# Patient Record
Sex: FEMALE | URBAN - METROPOLITAN AREA
[De-identification: names, ages, dates, MRNs, and addresses within clinical notes are randomized per-mention and may not be internally consistent; named-entity substitution may affect disease eponyms.]

---

## 2023-03-30 DIAGNOSIS — R92.8 OTHER ABNORMAL AND INCONCLUSIVE FINDINGS ON DIAGNOSTIC IMAGING OF BREAST: Primary | ICD-10-CM

## 2023-03-31 PROBLEM — N63.23 MASS OF LOWER OUTER QUADRANT OF LEFT BREAST: Status: ACTIVE | Noted: 2023-03-31

## 2023-03-31 NOTE — ASSESSMENT & PLAN NOTE
Patient has been given the options of sonocore and excisional biopsy and all indications for each have been discussed. We reviewed her films and reports. She understands the reasons behind obtaining tissue in order to determine a diagnosis.  We discussed the possible outcomes to include: benign, atypical, cancerous and non-correlative.  The last three would require an additional procedure.  She knows that further recommendations will be made after receiving the pathology report and that additional surgery/interventions may be needed.      PLAN:::: LEFT SONOCORE BREAST BIOPSY.    I will call her as soon as her pathology report is received.

## 2023-03-31 NOTE — PROGRESS NOTES
[facility]  Jimy Matthews MD, FACS  Hayden Trevizo NP-C      Chief Complaint:   Nathaly Chaudhary is a 77 y.o. female presenting today after her imaging workup found a suspicious mass in the left  breast for which biopsy has been recommended.   for She reports no interval changes on her self-breast examination.     History of Present Illness:   Nathaly Chaudhary is a 77 y.o. female who presents on with a left  breast mass for which biopsy has been recommended.  MD:::Mery Perez NP    Past Medical History:   Diagnosis Date    Mass of lower outer quadrant of left breast 3/31/2023      History reviewed. No pertinent surgical history.     Current Outpatient Medications:     albuterol (PROVENTIL/VENTOLIN HFA) 90 mcg/actuation inhaler, ProAir HFA Take No date recorded No form recorded No frequency recorded No route recorded No set duration recorded No set duration amount recorded active No dosage strength recorded No dosage strength units of measure recorded, Disp: , Rfl:     aspirin 81 MG Chew, Take 81 mg by mouth., Disp: , Rfl:     budesonide (PULMICORT) 0.25 mg/2 mL nebulizer solution, Inhale into the lungs., Disp: , Rfl:     celecoxib (CELEBREX) 100 MG capsule, celecoxib Take No date recorded No form recorded No frequency recorded No route recorded No set duration recorded No set duration amount recorded active No dosage strength recorded No dosage strength units of measure recorded, Disp: , Rfl:     cetirizine 0.24 % Dpet, cetirizine Take No date recorded No form recorded No frequency recorded No route recorded No set duration recorded No set duration amount recorded active No dosage strength recorded No dosage strength units of measure recorded, Disp: , Rfl:     cholecalciferol, vitamin D3, 10 mcg (400 unit) Cap capsule, Vitamin D3 Take No date recorded No form recorded No frequency recorded No route recorded No set duration recorded No set duration amount recorded active No  dosage strength recorded No dosage strength units of measure recorded, Disp: , Rfl:     diclofenac (VOLTAREN) 75 MG EC tablet, diclofenac sodium Take 2 times per day No date recorded tablet,delayed release (DR/EC) 2 times per day No route recorded No set duration recorded No set duration amount recorded active 75 mg, Disp: , Rfl:     esomeprazole (NEXIUM) 40 MG capsule, esomeprazole magnesium Take 1 time per day No date recorded capsule,delayed release(DR/EC) 1 time per day No route recorded No set duration recorded No set duration amount recorded active 40 mg, Disp: , Rfl:     fexofenadine (ALLEGRA) 180 MG tablet, fexofenadine Take No date recorded No form recorded No frequency recorded No route recorded No set duration recorded No set duration amount recorded suspended No dosage strength recorded No dosage strength units of measure recorded, Disp: , Rfl:     fluticasone-umeclidin-vilanter (TRELEGY ELLIPTA) 100-62.5-25 mcg DsDv, Inhale 1 puff into the lungs once daily., Disp: , Rfl:     gabapentin (NEURONTIN) 100 MG capsule, Take by mouth., Disp: , Rfl:     levalbuterol (XOPENEX) 0.31 mg/3 mL nebulizer solution, Inhale into the lungs., Disp: , Rfl:     LINZESS 290 mcg Cap capsule, Take by mouth., Disp: , Rfl:     lisinopriL (PRINIVIL,ZESTRIL) 20 MG tablet, lisinopril Take 1 time per day No date recorded tablet 1 time per day No route recorded No set duration recorded No set duration amount recorded active 20 mg, Disp: , Rfl:     losartan-hydrochlorothiazide 100-12.5 mg (HYZAAR) 100-12.5 mg Tab, Take by mouth., Disp: , Rfl:     metFORMIN (GLUCOPHAGE) 500 MG tablet, metformin Take 2 times per day No date recorded tablet 2 times per day No route recorded No set duration recorded No set duration amount recorded active 500 mg, Disp: , Rfl:     metoprolol succinate (TOPROL-XL) 25 MG 24 hr tablet, metoprolol succinate Take 1 time per day No date recorded tablet extended release 24 hr 1 time per day No route recorded No  set duration recorded No set duration amount recorded active 25 mg, Disp: , Rfl:     montelukast (SINGULAIR) 10 mg tablet, Take by mouth., Disp: , Rfl:     predniSONE (DELTASONE) 10 MG tablet, Take by mouth., Disp: , Rfl:     rosuvastatin (CRESTOR) 20 MG tablet, Take by mouth., Disp: , Rfl:     tiotropium (SPIRIVA) 18 mcg inhalation capsule, Spiriva with HandiHaler Take No date recorded No form recorded No frequency recorded No route recorded No set duration recorded No set duration amount recorded suspended No dosage strength recorded No dosage strength units of measure recorded, Disp: , Rfl:     triamcinolone (KENALOG) 0.5 % ointment, Apply topically., Disp: , Rfl:    Review of patient's allergies indicates:   Allergen Reactions    Tetanus vaccines and toxoid       Social History     Tobacco Use    Smoking status: Never    Smokeless tobacco: Never   Substance Use Topics    Alcohol use: Never      History reviewed. No pertinent family history.     Review of Systems   Integumentary:  Negative for color change, rash, mole/lesion, breast mass, breast discharge and breast tenderness.   Breast: Negative for mass and tenderness     Physical Exam   Constitutional: She appears well-developed. She is cooperative.   HENT:   Head: Normocephalic.   Cardiovascular:  Normal rate and regular rhythm.            Pulmonary/Chest: She exhibits no tenderness and no bony tenderness. Right breast exhibits no mass, no nipple discharge, no skin change and no tenderness. Left breast exhibits no mass, no nipple discharge, no skin change and no tenderness.   Abdominal: Soft. Normal appearance.   Musculoskeletal: Lymphadenopathy:      Upper Body:      Right upper body: No supraclavicular or axillary adenopathy.      Left upper body: No supraclavicular or axillary adenopathy.     Neurological: She is alert.   Skin: No rash noted.        MAMMOGRAM REPORT: area of asymmetry PTN    ULTRASOUND REPORT: 1.3 cm irregular mass at 5 o'clock  PTN    ASSESSMENT and PLAN of CARE    1. Mass of lower outer quadrant of left breast  Assessment & Plan:  Patient has been given the options of sonocore and excisional biopsy and all indications for each have been discussed. We reviewed her films and reports. She understands the reasons behind obtaining tissue in order to determine a diagnosis.  We discussed the possible outcomes to include: benign, atypical, cancerous and non-correlative.  The last three would require an additional procedure.  She knows that further recommendations will be made after receiving the pathology report and that additional surgery/interventions may be needed.      PLAN:::: LEFT SONOCORE BREAST BIOPSY.    I will call her as soon as her pathology report is received.        2. Other abnormal and inconclusive findings on diagnostic imaging of breast  -     Ambulatory referral/consult to Breast Surgery    Medical Decision Making:  It is my impression that this patient suffers all conditions contained in this medical document.  Each of these conditions did affect our plan of care and my medical decision making today.  It is my opinion that the medical decision making concerning this patient was of moderate difficulty based on the aforementioned conditions.  Any further recommendations will be communicated to the patient by me.  I have reviewed and verified her allergies, list of medications, medical and surgical histories, social history, and a pertinent review of symptoms.    Follow up:  I will phone her with her pathology report and make additional recommendations at that time.

## 2023-04-03 ENCOUNTER — OFFICE VISIT (OUTPATIENT)
Dept: SURGERY | Facility: CLINIC | Age: 78
End: 2023-04-03
Payer: MEDICARE

## 2023-04-03 VITALS — HEIGHT: 59 IN | WEIGHT: 176 LBS | BODY MASS INDEX: 35.48 KG/M2

## 2023-04-03 DIAGNOSIS — N63.23 MASS OF LOWER OUTER QUADRANT OF LEFT BREAST: ICD-10-CM

## 2023-04-03 DIAGNOSIS — R92.8 OTHER ABNORMAL AND INCONCLUSIVE FINDINGS ON DIAGNOSTIC IMAGING OF BREAST: ICD-10-CM

## 2023-04-03 PROCEDURE — 1159F PR MEDICATION LIST DOCUMENTED IN MEDICAL RECORD: ICD-10-PCS | Mod: CPTII,S$GLB,, | Performed by: SPECIALIST

## 2023-04-03 PROCEDURE — 99204 OFFICE O/P NEW MOD 45 MIN: CPT | Mod: S$GLB,,, | Performed by: SPECIALIST

## 2023-04-03 PROCEDURE — 1160F RVW MEDS BY RX/DR IN RCRD: CPT | Mod: CPTII,S$GLB,, | Performed by: SPECIALIST

## 2023-04-03 PROCEDURE — 1160F PR REVIEW ALL MEDS BY PRESCRIBER/CLIN PHARMACIST DOCUMENTED: ICD-10-PCS | Mod: CPTII,S$GLB,, | Performed by: SPECIALIST

## 2023-04-03 PROCEDURE — 99204 PR OFFICE/OUTPT VISIT, NEW, LEVL IV, 45-59 MIN: ICD-10-PCS | Mod: S$GLB,,, | Performed by: SPECIALIST

## 2023-04-03 PROCEDURE — 1126F PR PAIN SEVERITY QUANTIFIED, NO PAIN PRESENT: ICD-10-PCS | Mod: CPTII,S$GLB,, | Performed by: SPECIALIST

## 2023-04-03 PROCEDURE — 1126F AMNT PAIN NOTED NONE PRSNT: CPT | Mod: CPTII,S$GLB,, | Performed by: SPECIALIST

## 2023-04-03 PROCEDURE — 1159F MED LIST DOCD IN RCRD: CPT | Mod: CPTII,S$GLB,, | Performed by: SPECIALIST

## 2023-04-03 RX ORDER — TIOTROPIUM BROMIDE 18 UG/1
CAPSULE ORAL; RESPIRATORY (INHALATION)
COMMUNITY

## 2023-04-03 RX ORDER — METOPROLOL SUCCINATE 25 MG/1
TABLET, EXTENDED RELEASE ORAL
COMMUNITY

## 2023-04-03 RX ORDER — CELECOXIB 100 MG/1
CAPSULE ORAL
COMMUNITY

## 2023-04-03 RX ORDER — ROSUVASTATIN CALCIUM 20 MG/1
TABLET, COATED ORAL
COMMUNITY
Start: 2023-01-09

## 2023-04-03 RX ORDER — GABAPENTIN 100 MG/1
CAPSULE ORAL
COMMUNITY
Start: 2023-03-08

## 2023-04-03 RX ORDER — MINERAL OIL
ENEMA (ML) RECTAL
COMMUNITY

## 2023-04-03 RX ORDER — BUDESONIDE 0.25 MG/2ML
INHALANT ORAL
COMMUNITY

## 2023-04-03 RX ORDER — LOSARTAN POTASSIUM AND HYDROCHLOROTHIAZIDE 12.5; 1 MG/1; MG/1
TABLET ORAL
COMMUNITY
Start: 2023-01-09

## 2023-04-03 RX ORDER — DICLOFENAC SODIUM 75 MG/1
TABLET, DELAYED RELEASE ORAL
COMMUNITY

## 2023-04-03 RX ORDER — NAPROXEN SODIUM 220 MG/1
81 TABLET, FILM COATED ORAL
COMMUNITY

## 2023-04-03 RX ORDER — LISINOPRIL 20 MG/1
TABLET ORAL
COMMUNITY

## 2023-04-03 RX ORDER — ESOMEPRAZOLE MAGNESIUM 40 MG/1
CAPSULE, DELAYED RELEASE ORAL
COMMUNITY

## 2023-04-03 RX ORDER — PREDNISONE 10 MG/1
TABLET ORAL
COMMUNITY
Start: 2023-03-20

## 2023-04-03 RX ORDER — LEVALBUTEROL INHALATION SOLUTION 0.31 MG/3ML
SOLUTION RESPIRATORY (INHALATION)
COMMUNITY

## 2023-04-03 RX ORDER — ALBUTEROL SULFATE 90 UG/1
AEROSOL, METERED RESPIRATORY (INHALATION)
COMMUNITY

## 2023-04-03 RX ORDER — TRIAMCINOLONE ACETONIDE 5 MG/G
OINTMENT TOPICAL
COMMUNITY
Start: 2023-03-20

## 2023-04-03 RX ORDER — CALCIUM CARB/VITAMIN D3/VIT K1 500-500-40
TABLET,CHEWABLE ORAL
COMMUNITY

## 2023-04-03 RX ORDER — METFORMIN HYDROCHLORIDE 500 MG/1
TABLET ORAL
COMMUNITY

## 2023-04-03 RX ORDER — LINACLOTIDE 290 UG/1
CAPSULE, GELATIN COATED ORAL
COMMUNITY
Start: 2023-01-09

## 2023-04-03 RX ORDER — MONTELUKAST SODIUM 10 MG/1
TABLET ORAL
COMMUNITY
Start: 2023-01-09

## 2023-04-10 ENCOUNTER — OFFICE VISIT (OUTPATIENT)
Dept: SURGERY | Facility: CLINIC | Age: 78
End: 2023-04-10
Payer: MEDICARE

## 2023-04-10 DIAGNOSIS — C50.512 MALIGNANT NEOPLASM OF LOWER-OUTER QUADRANT OF LEFT BREAST OF FEMALE, ESTROGEN RECEPTOR POSITIVE: ICD-10-CM

## 2023-04-10 DIAGNOSIS — Z17.0 MALIGNANT NEOPLASM OF LOWER-OUTER QUADRANT OF LEFT BREAST OF FEMALE, ESTROGEN RECEPTOR POSITIVE: ICD-10-CM

## 2023-04-10 PROCEDURE — 1159F MED LIST DOCD IN RCRD: CPT | Mod: CPTII,S$GLB,, | Performed by: SPECIALIST

## 2023-04-10 PROCEDURE — 1160F RVW MEDS BY RX/DR IN RCRD: CPT | Mod: CPTII,S$GLB,, | Performed by: SPECIALIST

## 2023-04-10 PROCEDURE — 99215 OFFICE O/P EST HI 40 MIN: CPT | Mod: S$GLB,,, | Performed by: SPECIALIST

## 2023-04-10 PROCEDURE — 1160F PR REVIEW ALL MEDS BY PRESCRIBER/CLIN PHARMACIST DOCUMENTED: ICD-10-PCS | Mod: CPTII,S$GLB,, | Performed by: SPECIALIST

## 2023-04-10 PROCEDURE — 99215 PR OFFICE/OUTPT VISIT, EST, LEVL V, 40-54 MIN: ICD-10-PCS | Mod: S$GLB,,, | Performed by: SPECIALIST

## 2023-04-10 PROCEDURE — 1159F PR MEDICATION LIST DOCUMENTED IN MEDICAL RECORD: ICD-10-PCS | Mod: CPTII,S$GLB,, | Performed by: SPECIALIST

## 2023-04-10 NOTE — PROGRESS NOTES
[facility]  Jimy Matthews MD, FACS  Hayden Trevizo, NP-C      Chief Complaint:   Nathaly Chaudhary is a 77 y.o. female presenting today after her imaging workup found a suspicious mass in the left  breast for which biopsy has been recommended.   for She reports no interval changes on her self-breast examination.     History of Present Illness:   Nathaly Chaudhary was diagnosed with left breast cancer in April 2023 at the age of 77. ER/ID Positive and HER 2 FISH negative.  MD:::Mery Perez NP; Beka Melton MD; Kendall Carranza MD; Plastics    Past Medical History:   Diagnosis Date    Malignant neoplasm of lower-outer quadrant of left breast of female, estrogen receptor positive 4/10/2023    Mass of lower outer quadrant of left breast 3/31/2023      History reviewed. No pertinent surgical history.     Current Outpatient Medications:     albuterol (PROVENTIL/VENTOLIN HFA) 90 mcg/actuation inhaler, ProAir HFA Take No date recorded No form recorded No frequency recorded No route recorded No set duration recorded No set duration amount recorded active No dosage strength recorded No dosage strength units of measure recorded, Disp: , Rfl:     aspirin 81 MG Chew, Take 81 mg by mouth., Disp: , Rfl:     budesonide (PULMICORT) 0.25 mg/2 mL nebulizer solution, Inhale into the lungs., Disp: , Rfl:     celecoxib (CELEBREX) 100 MG capsule, celecoxib Take No date recorded No form recorded No frequency recorded No route recorded No set duration recorded No set duration amount recorded active No dosage strength recorded No dosage strength units of measure recorded, Disp: , Rfl:     cetirizine 0.24 % Dpet, cetirizine Take No date recorded No form recorded No frequency recorded No route recorded No set duration recorded No set duration amount recorded active No dosage strength recorded No dosage strength units of measure recorded, Disp: , Rfl:     cholecalciferol, vitamin D3, 10 mcg (400 unit) Cap  capsule, Vitamin D3 Take No date recorded No form recorded No frequency recorded No route recorded No set duration recorded No set duration amount recorded active No dosage strength recorded No dosage strength units of measure recorded, Disp: , Rfl:     diclofenac (VOLTAREN) 75 MG EC tablet, diclofenac sodium Take 2 times per day No date recorded tablet,delayed release (DR/EC) 2 times per day No route recorded No set duration recorded No set duration amount recorded active 75 mg, Disp: , Rfl:     esomeprazole (NEXIUM) 40 MG capsule, esomeprazole magnesium Take 1 time per day No date recorded capsule,delayed release(DR/EC) 1 time per day No route recorded No set duration recorded No set duration amount recorded active 40 mg, Disp: , Rfl:     fexofenadine (ALLEGRA) 180 MG tablet, fexofenadine Take No date recorded No form recorded No frequency recorded No route recorded No set duration recorded No set duration amount recorded suspended No dosage strength recorded No dosage strength units of measure recorded, Disp: , Rfl:     fluticasone-umeclidin-vilanter (TRELEGY ELLIPTA) 100-62.5-25 mcg DsDv, Inhale 1 puff into the lungs once daily., Disp: , Rfl:     gabapentin (NEURONTIN) 100 MG capsule, Take by mouth., Disp: , Rfl:     levalbuterol (XOPENEX) 0.31 mg/3 mL nebulizer solution, Inhale into the lungs., Disp: , Rfl:     LINZESS 290 mcg Cap capsule, Take by mouth., Disp: , Rfl:     lisinopriL (PRINIVIL,ZESTRIL) 20 MG tablet, lisinopril Take 1 time per day No date recorded tablet 1 time per day No route recorded No set duration recorded No set duration amount recorded active 20 mg, Disp: , Rfl:     losartan-hydrochlorothiazide 100-12.5 mg (HYZAAR) 100-12.5 mg Tab, Take by mouth., Disp: , Rfl:     metFORMIN (GLUCOPHAGE) 500 MG tablet, metformin Take 2 times per day No date recorded tablet 2 times per day No route recorded No set duration recorded No set duration amount recorded active 500 mg, Disp: , Rfl:     metoprolol  succinate (TOPROL-XL) 25 MG 24 hr tablet, metoprolol succinate Take 1 time per day No date recorded tablet extended release 24 hr 1 time per day No route recorded No set duration recorded No set duration amount recorded active 25 mg, Disp: , Rfl:     montelukast (SINGULAIR) 10 mg tablet, Take by mouth., Disp: , Rfl:     predniSONE (DELTASONE) 10 MG tablet, Take by mouth., Disp: , Rfl:     rosuvastatin (CRESTOR) 20 MG tablet, Take by mouth., Disp: , Rfl:     tiotropium (SPIRIVA) 18 mcg inhalation capsule, Spiriva with HandiHaler Take No date recorded No form recorded No frequency recorded No route recorded No set duration recorded No set duration amount recorded suspended No dosage strength recorded No dosage strength units of measure recorded, Disp: , Rfl:     triamcinolone (KENALOG) 0.5 % ointment, Apply topically., Disp: , Rfl:    Review of patient's allergies indicates:   Allergen Reactions    Tetanus vaccines and toxoid       Social History     Tobacco Use    Smoking status: Never    Smokeless tobacco: Never   Substance Use Topics    Alcohol use: Never      History reviewed. No pertinent family history.     Review of Systems   Integumentary:  Negative for color change, rash, mole/lesion, breast mass, breast discharge and breast tenderness.   Breast: Negative for mass and tenderness     Physical Exam   Constitutional: She appears well-developed. She is cooperative.   HENT:   Head: Normocephalic.   Cardiovascular:  Normal rate and regular rhythm.            Pulmonary/Chest: She exhibits no tenderness and no bony tenderness. Right breast exhibits no mass, no nipple discharge, no skin change and no tenderness. Left breast exhibits no mass, no nipple discharge, no skin change and no tenderness.   Abdominal: Soft. Normal appearance.   Musculoskeletal: Lymphadenopathy:      Upper Body:      Right upper body: No supraclavicular or axillary adenopathy.      Left upper body: No supraclavicular or axillary adenopathy.  "    Neurological: She is alert.   Skin: No rash noted.        MAMMOGRAM REPORT: area of asymmetry PTN    ULTRASOUND REPORT: 1.3 cm irregular mass at 5 o'clock PTN    ASSESSMENT and PLAN of CARE    1. Malignant neoplasm of lower-outer quadrant of left breast of female, estrogen receptor positive  Assessment & Plan:  This diagnosis was discussed with the patient in detail. We discussed a multidisciplinary approach to the treatment of her breast cancer - which can include a Medical and Radiation Oncologist, a Geneticists, a Plastic Surgeon and our Cancer Navigator. We discussed mastectomy with or without reconstruction in addition to BCS as treatment. She knows that because this is a LOQ lesion - that there is the potential to have significant retraction if she chooses BCS. She is also large breasted and may have more difficulty with radiation.  However, the Plastic Surgeon can offer Oncoplastic Reduction/Lift at the time of resection to diminish this outcome.We discussed the possiblilties of chemotherapy, radiation, and anti-estrogen treatment in detail. Her risk of a genetic mutation given her age at diagnosis and her family history according to the Mutation Prevalence Table is calculated to be 3.8%.  Therefore, she will not require testing.  She understands that more information will be gained from an MRI, Lab evaluation (and other imaging studies if needed). She will return in one week to discuss all results and upcoming surgery. At least 45 minutes of direct face to face discussions were held with this patient.She will be followed according to NCCN Guidelines.     CONSULTANTS: Beka Melton MD; Kendall Carranza MD; Plastic Surgery      In accordance with [place] R[place] 40:1300.154, the brochure "Breast Cancer Treatment Alternatives" was discussed with and given to this patient during this consult for her new diagnosis of breast cancer. Attention was given to the advantages, disadvantages, risks, and descriptions of the " "procedure regarding medically viable and efficacious alternative methods of treatment for breast cancer including surgical, radiological, and chemotherapeutic treatments and/or combinations of these treatments. The problems, beneifits and alternatives to breast cancer reconstruction surgery was also discussed. A written summary of these findings is found in my "Consultation Form" that is given to the patient and scanned into the patient medical record.         Medical Decision Making:  It is my impression that this patient suffers all conditions contained in this medical document.  Each of these conditions did affect our plan of care and my medical decision making today.  It is my opinion that the medical decision making concerning this patient was of moderate difficulty based on the aforementioned conditions.  Any further recommendations will be communicated to the patient by me.  I have reviewed and verified her allergies, list of medications, medical and surgical histories, social history, and a pertinent review of symptoms.    Follow up:  1-2 weeks for pre op      "

## 2023-04-10 NOTE — ASSESSMENT & PLAN NOTE
"This diagnosis was discussed with the patient in detail. We discussed a multidisciplinary approach to the treatment of her breast cancer - which can include a Medical and Radiation Oncologist, a Geneticists, a Plastic Surgeon and our Cancer Navigator. We discussed mastectomy with or without reconstruction in addition to BCS as treatment. She knows that because this is a LOQ lesion - that there is the potential to have significant retraction if she chooses BCS. She is also large breasted and may have more difficulty with radiation.  However, the Plastic Surgeon can offer Oncoplastic Reduction/Lift at the time of resection to diminish this outcome.We discussed the possiblilties of chemotherapy, radiation, and anti-estrogen treatment in detail. Her risk of a genetic mutation given her age at diagnosis and her family history according to the Mutation Prevalence Table is calculated to be 3.8%.  Therefore, she will not require testing.  She understands that more information will be gained from an MRI, Lab evaluation (and other imaging studies if needed). She will return in one week to discuss all results and upcoming surgery. At least 45 minutes of direct face to face discussions were held with this patient.She will be followed according to NCCN Guidelines.     CONSULTANTS: Beka Melton MD; Kendall Carranza MD; Plastic Surgery      In accordance with [place] R.S. 40:1300.154, the brochure "Breast Cancer Treatment Alternatives" was discussed with and given to this patient during this consult for her new diagnosis of breast cancer. Attention was given to the advantages, disadvantages, risks, and descriptions of the procedure regarding medically viable and efficacious alternative methods of treatment for breast cancer including surgical, radiological, and chemotherapeutic treatments and/or combinations of these treatments. The problems, beneifits and alternatives to breast cancer reconstruction surgery was also discussed. A " "written summary of these findings is found in my "Consultation Form" that is given to the patient and scanned into the patient medical record.     "

## 2023-04-11 NOTE — PROGRESS NOTES
[facility]  Jimy Matthews MD, FACS  Hayden Trevizo NP-C      Chief Complaint:   Nathaly Chaudhary is a 77 y.o. female who presents to discuss definitive surgical treatment.    History of Present Illness:   Nathaly Chaudhary was diagnosed with left breast cancer in April 2023 at the age of 77. ER/TN Positive and HER 2 FISH negative.  MD:::Mery Perez NP; Beka Melton MD; Kendall Carranza MD; Plastics    Past Medical History:   Diagnosis Date    Malignant neoplasm of lower-outer quadrant of left breast of female, estrogen receptor positive 4/10/2023    Mass of lower outer quadrant of left breast 3/31/2023      No past surgical history on file.     Current Outpatient Medications:     albuterol (PROVENTIL/VENTOLIN HFA) 90 mcg/actuation inhaler, ProAir HFA Take No date recorded No form recorded No frequency recorded No route recorded No set duration recorded No set duration amount recorded active No dosage strength recorded No dosage strength units of measure recorded, Disp: , Rfl:     aspirin 81 MG Chew, Take 81 mg by mouth., Disp: , Rfl:     budesonide (PULMICORT) 0.25 mg/2 mL nebulizer solution, Inhale into the lungs., Disp: , Rfl:     celecoxib (CELEBREX) 100 MG capsule, celecoxib Take No date recorded No form recorded No frequency recorded No route recorded No set duration recorded No set duration amount recorded active No dosage strength recorded No dosage strength units of measure recorded, Disp: , Rfl:     cetirizine 0.24 % Dpet, cetirizine Take No date recorded No form recorded No frequency recorded No route recorded No set duration recorded No set duration amount recorded active No dosage strength recorded No dosage strength units of measure recorded, Disp: , Rfl:     cholecalciferol, vitamin D3, 10 mcg (400 unit) Cap capsule, Vitamin D3 Take No date recorded No form recorded No frequency recorded No route recorded No set duration recorded No set duration amount recorded  active No dosage strength recorded No dosage strength units of measure recorded, Disp: , Rfl:     diclofenac (VOLTAREN) 75 MG EC tablet, diclofenac sodium Take 2 times per day No date recorded tablet,delayed release (DR/EC) 2 times per day No route recorded No set duration recorded No set duration amount recorded active 75 mg, Disp: , Rfl:     esomeprazole (NEXIUM) 40 MG capsule, esomeprazole magnesium Take 1 time per day No date recorded capsule,delayed release(DR/EC) 1 time per day No route recorded No set duration recorded No set duration amount recorded active 40 mg, Disp: , Rfl:     fexofenadine (ALLEGRA) 180 MG tablet, fexofenadine Take No date recorded No form recorded No frequency recorded No route recorded No set duration recorded No set duration amount recorded suspended No dosage strength recorded No dosage strength units of measure recorded, Disp: , Rfl:     fluticasone-umeclidin-vilanter (TRELEGY ELLIPTA) 100-62.5-25 mcg DsDv, Inhale 1 puff into the lungs once daily., Disp: , Rfl:     gabapentin (NEURONTIN) 100 MG capsule, Take by mouth., Disp: , Rfl:     levalbuterol (XOPENEX) 0.31 mg/3 mL nebulizer solution, Inhale into the lungs., Disp: , Rfl:     LINZESS 290 mcg Cap capsule, Take by mouth., Disp: , Rfl:     lisinopriL (PRINIVIL,ZESTRIL) 20 MG tablet, lisinopril Take 1 time per day No date recorded tablet 1 time per day No route recorded No set duration recorded No set duration amount recorded active 20 mg, Disp: , Rfl:     losartan-hydrochlorothiazide 100-12.5 mg (HYZAAR) 100-12.5 mg Tab, Take by mouth., Disp: , Rfl:     metFORMIN (GLUCOPHAGE) 500 MG tablet, metformin Take 2 times per day No date recorded tablet 2 times per day No route recorded No set duration recorded No set duration amount recorded active 500 mg, Disp: , Rfl:     metoprolol succinate (TOPROL-XL) 25 MG 24 hr tablet, metoprolol succinate Take 1 time per day No date recorded tablet extended release 24 hr 1 time per day No route  recorded No set duration recorded No set duration amount recorded active 25 mg, Disp: , Rfl:     montelukast (SINGULAIR) 10 mg tablet, Take by mouth., Disp: , Rfl:     predniSONE (DELTASONE) 10 MG tablet, Take by mouth., Disp: , Rfl:     rosuvastatin (CRESTOR) 20 MG tablet, Take by mouth., Disp: , Rfl:     tiotropium (SPIRIVA) 18 mcg inhalation capsule, Spiriva with HandiHaler Take No date recorded No form recorded No frequency recorded No route recorded No set duration recorded No set duration amount recorded suspended No dosage strength recorded No dosage strength units of measure recorded, Disp: , Rfl:     triamcinolone (KENALOG) 0.5 % ointment, Apply topically., Disp: , Rfl:    Review of patient's allergies indicates:   Allergen Reactions    Tetanus vaccines and toxoid       Social History     Tobacco Use    Smoking status: Never    Smokeless tobacco: Never   Substance Use Topics    Alcohol use: Never      No family history on file.     Review of Systems   Integumentary:  Negative for color change, rash, mole/lesion, breast mass, breast discharge and breast tenderness.   Breast: Negative for mass and tenderness     Physical Exam   Constitutional: She appears well-developed. She is cooperative.   HENT:   Head: Normocephalic.   Cardiovascular:  Normal rate and regular rhythm.            Pulmonary/Chest: She exhibits no tenderness and no bony tenderness. Right breast exhibits no mass, no nipple discharge, no skin change and no tenderness. Left breast exhibits no mass, no nipple discharge, no skin change and no tenderness.   Abdominal: Soft. Normal appearance.   Musculoskeletal: Lymphadenopathy:      Upper Body:      Right upper body: No supraclavicular or axillary adenopathy.      Left upper body: No supraclavicular or axillary adenopathy.     Neurological: She is alert.   Skin: No rash noted.        MAMMOGRAM REPORT: area of asymmetry PTN    ULTRASOUND REPORT: 1.3 cm irregular mass at 5 o'clock PTN    CXR REPORT:  Her lungs are well aerated and without worrisome masses; no pleural effusion noted; NEM    MRI REPORT: RIGHT breast okay; LEFT breast with 2 cm enhancing mass (known cancer) and left internal mammary LN that is suspicious    ASSESSMENT and PLAN of CARE    1. Malignant neoplasm of lower-outer quadrant of left breast of female, estrogen receptor positive  Assessment & Plan:  We reviewed all workup studies, labs and imaging. We discussed Specialty recommendations. All R/B/I and alternatives to treatment have been discussed with the patient with respect to all available surgical options.    The risks of Lumpectomy include bleeding, infection, pain, temporary/chronic swelling, tenderness, formation of scar tissue at the surgical site, need to return to the OR for clearance of margins, and a change in the shape and appearance of the breast, etc.    The Risks of SLN Biopsy include (but are not limited to): bleeding, infection, injury to a major blood vessel, inability to identify the SLN; paralysis/atrophy of some of the shoulder muscles, numbness on the inside of the arm, swelling (lymphedema) in the arm on the side of the operation; poor wound healing, etc.     PLAN:::LEFT LUMPECTOMY AFTER NEEDLE LOCALIATION, LEFT SLN BIOPSY +/- AND followed by [facility].        Medical Decision Making:  It is my impression that this patient suffers all conditions contained in this medical document.  Each of these conditions did affect our plan of care and my medical decision making today.  It is my opinion that the medical decision making concerning this patient was of moderate difficulty based on the aforementioned conditions.  Any further recommendations will be communicated to the patient by me.  I have reviewed and verified her allergies, list of medications, medical and surgical histories, social history, and a pertinent review of symptoms.    Follow up:  1-2 weeks for post op

## 2023-04-11 NOTE — ASSESSMENT & PLAN NOTE
We reviewed all workup studies, labs and imaging. We discussed Specialty recommendations. All R/B/I and alternatives to treatment have been discussed with the patient with respect to all available surgical options.    The risks of Lumpectomy include bleeding, infection, pain, temporary/chronic swelling, tenderness, formation of scar tissue at the surgical site, need to return to the OR for clearance of margins, and a change in the shape and appearance of the breast, etc.    The Risks of SLN Biopsy include (but are not limited to): bleeding, infection, injury to a major blood vessel, inability to identify the SLN; paralysis/atrophy of some of the shoulder muscles, numbness on the inside of the arm, swelling (lymphedema) in the arm on the side of the operation; poor wound healing, etc.     PLAN:::LEFT LUMPECTOMY AFTER NEEDLE LOCALIATION, LEFT SLN BIOPSY +/- AND followed by [facility].

## 2023-04-17 ENCOUNTER — OFFICE VISIT (OUTPATIENT)
Dept: SURGERY | Facility: CLINIC | Age: 78
End: 2023-04-17
Payer: MEDICARE

## 2023-04-17 VITALS — WEIGHT: 175.94 LBS | BODY MASS INDEX: 35.47 KG/M2 | HEIGHT: 59 IN

## 2023-04-17 DIAGNOSIS — C50.512 MALIGNANT NEOPLASM OF LOWER-OUTER QUADRANT OF LEFT BREAST OF FEMALE, ESTROGEN RECEPTOR POSITIVE: ICD-10-CM

## 2023-04-17 DIAGNOSIS — Z17.0 MALIGNANT NEOPLASM OF LOWER-OUTER QUADRANT OF LEFT BREAST OF FEMALE, ESTROGEN RECEPTOR POSITIVE: ICD-10-CM

## 2023-04-17 PROCEDURE — 1101F PT FALLS ASSESS-DOCD LE1/YR: CPT | Mod: CPTII,S$GLB,, | Performed by: SPECIALIST

## 2023-04-17 PROCEDURE — 3288F FALL RISK ASSESSMENT DOCD: CPT | Mod: CPTII,S$GLB,, | Performed by: SPECIALIST

## 2023-04-17 PROCEDURE — 99214 OFFICE O/P EST MOD 30 MIN: CPT | Mod: S$GLB,,, | Performed by: SPECIALIST

## 2023-04-17 PROCEDURE — 1159F PR MEDICATION LIST DOCUMENTED IN MEDICAL RECORD: ICD-10-PCS | Mod: CPTII,S$GLB,, | Performed by: SPECIALIST

## 2023-04-17 PROCEDURE — 1159F MED LIST DOCD IN RCRD: CPT | Mod: CPTII,S$GLB,, | Performed by: SPECIALIST

## 2023-04-17 PROCEDURE — 1126F AMNT PAIN NOTED NONE PRSNT: CPT | Mod: CPTII,S$GLB,, | Performed by: SPECIALIST

## 2023-04-17 PROCEDURE — 1101F PR PT FALLS ASSESS DOC 0-1 FALLS W/OUT INJ PAST YR: ICD-10-PCS | Mod: CPTII,S$GLB,, | Performed by: SPECIALIST

## 2023-04-17 PROCEDURE — 99214 PR OFFICE/OUTPT VISIT, EST, LEVL IV, 30-39 MIN: ICD-10-PCS | Mod: S$GLB,,, | Performed by: SPECIALIST

## 2023-04-17 PROCEDURE — 3288F PR FALLS RISK ASSESSMENT DOCUMENTED: ICD-10-PCS | Mod: CPTII,S$GLB,, | Performed by: SPECIALIST

## 2023-04-17 PROCEDURE — 1126F PR PAIN SEVERITY QUANTIFIED, NO PAIN PRESENT: ICD-10-PCS | Mod: CPTII,S$GLB,, | Performed by: SPECIALIST

## 2023-05-04 ENCOUNTER — OUTSIDE PLACE OF SERVICE (OUTPATIENT)
Dept: SURGERY | Facility: CLINIC | Age: 78
End: 2023-05-04
Payer: MEDICARE

## 2023-05-04 PROCEDURE — 19301 PARTIAL MASTECTOMY: CPT | Mod: LT,,, | Performed by: SPECIALIST

## 2023-05-04 PROCEDURE — 38792 PR IDENTIFY SENTINEL 2DE: ICD-10-PCS | Mod: 51,LT,, | Performed by: SPECIALIST

## 2023-05-04 PROCEDURE — 19301 PR MASTECTOMY, PARTIAL: ICD-10-PCS | Mod: LT,,, | Performed by: SPECIALIST

## 2023-05-04 PROCEDURE — 38525 PR BIOPSY/REM LYMPH NODES, AXILLARY: ICD-10-PCS | Mod: 51,LT,, | Performed by: SPECIALIST

## 2023-05-04 PROCEDURE — 38792 RA TRACER ID OF SENTINL NODE: CPT | Mod: 51,LT,, | Performed by: SPECIALIST

## 2023-05-04 PROCEDURE — 38525 BIOPSY/REMOVAL LYMPH NODES: CPT | Mod: 51,LT,, | Performed by: SPECIALIST

## 2023-05-09 NOTE — PROGRESS NOTES
[facility]  Jimy Matthews MD, FACS  Hayden Trevizo NP-C      Chief Complaint:   Nathaly Chaudhary is a 78 y.o. female who presents for a post operative evaluation.    History of Present Illness:   Nathaly Chaudhary was diagnosed with left breast cancer in April 2023 at the age of 77. She elected lumpectomy that showed a 1.9 cm Grade II IDC with negative margins and a negative SLN.  T1cN0.  ER/OR Positive and HER 2 FISH negative.  MD:::Mery Perez NP; Beka Melton MD; Kendall Carranza MD; Plastics    Past Medical History:   Diagnosis Date    Malignant neoplasm of lower-outer quadrant of left breast of female, estrogen receptor positive 4/10/2023    Mass of lower outer quadrant of left breast 3/31/2023      Past Surgical History:   Procedure Laterality Date    BREAST LUMPECTOMY W/ NEEDLE LOCALIZATION Left 05/04/2023    with SLN Biopsy and [facility] (reduction/lift)        Current Outpatient Medications:     albuterol (PROVENTIL/VENTOLIN HFA) 90 mcg/actuation inhaler, ProAir HFA Take No date recorded No form recorded No frequency recorded No route recorded No set duration recorded No set duration amount recorded active No dosage strength recorded No dosage strength units of measure recorded, Disp: , Rfl:     aspirin 81 MG Chew, Take 81 mg by mouth., Disp: , Rfl:     budesonide (PULMICORT) 0.25 mg/2 mL nebulizer solution, Inhale into the lungs., Disp: , Rfl:     celecoxib (CELEBREX) 100 MG capsule, celecoxib Take No date recorded No form recorded No frequency recorded No route recorded No set duration recorded No set duration amount recorded active No dosage strength recorded No dosage strength units of measure recorded, Disp: , Rfl:     cetirizine 0.24 % Dpet, cetirizine Take No date recorded No form recorded No frequency recorded No route recorded No set duration recorded No set duration amount recorded active No dosage strength recorded No dosage strength units of measure  recorded, Disp: , Rfl:     cholecalciferol, vitamin D3, 10 mcg (400 unit) Cap capsule, Vitamin D3 Take No date recorded No form recorded No frequency recorded No route recorded No set duration recorded No set duration amount recorded active No dosage strength recorded No dosage strength units of measure recorded, Disp: , Rfl:     diclofenac (VOLTAREN) 75 MG EC tablet, diclofenac sodium Take 2 times per day No date recorded tablet,delayed release (DR/EC) 2 times per day No route recorded No set duration recorded No set duration amount recorded active 75 mg, Disp: , Rfl:     esomeprazole (NEXIUM) 40 MG capsule, esomeprazole magnesium Take 1 time per day No date recorded capsule,delayed release(DR/EC) 1 time per day No route recorded No set duration recorded No set duration amount recorded active 40 mg, Disp: , Rfl:     fexofenadine (ALLEGRA) 180 MG tablet, fexofenadine Take No date recorded No form recorded No frequency recorded No route recorded No set duration recorded No set duration amount recorded suspended No dosage strength recorded No dosage strength units of measure recorded, Disp: , Rfl:     fluticasone-umeclidin-vilanter (TRELEGY ELLIPTA) 100-62.5-25 mcg DsDv, Inhale 1 puff into the lungs once daily., Disp: , Rfl:     gabapentin (NEURONTIN) 100 MG capsule, Take by mouth., Disp: , Rfl:     levalbuterol (XOPENEX) 0.31 mg/3 mL nebulizer solution, Inhale into the lungs., Disp: , Rfl:     LINZESS 290 mcg Cap capsule, Take by mouth., Disp: , Rfl:     lisinopriL (PRINIVIL,ZESTRIL) 20 MG tablet, lisinopril Take 1 time per day No date recorded tablet 1 time per day No route recorded No set duration recorded No set duration amount recorded active 20 mg, Disp: , Rfl:     losartan-hydrochlorothiazide 100-12.5 mg (HYZAAR) 100-12.5 mg Tab, Take by mouth., Disp: , Rfl:     metFORMIN (GLUCOPHAGE) 500 MG tablet, metformin Take 2 times per day No date recorded tablet 2 times per day No route recorded No set duration recorded  No set duration amount recorded active 500 mg, Disp: , Rfl:     metoprolol succinate (TOPROL-XL) 25 MG 24 hr tablet, metoprolol succinate Take 1 time per day No date recorded tablet extended release 24 hr 1 time per day No route recorded No set duration recorded No set duration amount recorded active 25 mg, Disp: , Rfl:     montelukast (SINGULAIR) 10 mg tablet, Take by mouth., Disp: , Rfl:     predniSONE (DELTASONE) 10 MG tablet, Take by mouth., Disp: , Rfl:     rosuvastatin (CRESTOR) 20 MG tablet, Take by mouth., Disp: , Rfl:     tiotropium (SPIRIVA) 18 mcg inhalation capsule, Spiriva with HandiHaler Take No date recorded No form recorded No frequency recorded No route recorded No set duration recorded No set duration amount recorded suspended No dosage strength recorded No dosage strength units of measure recorded, Disp: , Rfl:     triamcinolone (KENALOG) 0.5 % ointment, Apply topically., Disp: , Rfl:    Review of patient's allergies indicates:   Allergen Reactions    Adhesive tape-silicones     Tetanus vaccines and toxoid       Social History     Tobacco Use    Smoking status: Never    Smokeless tobacco: Never   Substance Use Topics    Alcohol use: Never      History reviewed. No pertinent family history.     Review of Systems   Constitutional:  Positive for activity change (slowed down after surgery). Negative for fever.   Respiratory:  Negative for shortness of breath.    Cardiovascular:  Negative for chest pain.   Integumentary:  Negative for color change, rash, wound, mole/lesion, breast mass, breast discharge and breast tenderness.   Breast: Negative for mass and tenderness     Physical Exam   Constitutional: She appears well-developed. She is cooperative.   HENT:   Head: Normocephalic.   Cardiovascular:  Normal rate and regular rhythm.            Pulmonary/Chest: Effort normal. She exhibits no tenderness, no bony tenderness and no swelling. Right breast exhibits no mass, no nipple discharge, no skin change  and no tenderness. Left breast exhibits no mass, no nipple discharge, no skin change and no tenderness.   Abdominal: Soft. Normal appearance.   Musculoskeletal: Lymphadenopathy:      Upper Body:      Right upper body: No supraclavicular or axillary adenopathy.      Left upper body: No supraclavicular or axillary adenopathy.     Neurological: She is alert.   Skin: No rash noted.       ASSESSMENT and PLAN of CARE    1. Malignant neoplasm of lower-outer quadrant of left breast of female, estrogen receptor positive  Assessment & Plan:  She will be followed according to NCCN Guidelines. She is doing great post operative and will alert me to any issues. Pathology reports were discussed in detail. All questions were answered and recommendations were made for future treatments/follow-up. We discussed the importance of following up with all Specialists for adjuvant treatment recommendations        Medical Decision Making:  It is my impression that this patient suffers all conditions contained in this medical document.  Each of these conditions did affect our plan of care and my medical decision making today.  It is my opinion that the medical decision making concerning this patient was of minimal  difficulty based on the aforementioned conditions.  Any further recommendations will be communicated to the patient by me.  I have reviewed and verified her allergies, list of medications, medical and surgical histories, social history, and a pertinent review of symptoms.    Follow up:  3 months for PE

## 2023-05-09 NOTE — ASSESSMENT & PLAN NOTE
She will be followed according to NCCN Guidelines. She is doing great post operative and will alert me to any issues. Pathology reports were discussed in detail. All questions were answered and recommendations were made for future treatments/follow-up. We discussed the importance of following up with all Specialists for adjuvant treatment recommendations

## 2023-05-10 ENCOUNTER — OFFICE VISIT (OUTPATIENT)
Dept: SURGERY | Facility: CLINIC | Age: 78
End: 2023-05-10
Payer: MEDICARE

## 2023-05-10 DIAGNOSIS — C50.512 MALIGNANT NEOPLASM OF LOWER-OUTER QUADRANT OF LEFT BREAST OF FEMALE, ESTROGEN RECEPTOR POSITIVE: ICD-10-CM

## 2023-05-10 DIAGNOSIS — Z17.0 MALIGNANT NEOPLASM OF LOWER-OUTER QUADRANT OF LEFT BREAST OF FEMALE, ESTROGEN RECEPTOR POSITIVE: ICD-10-CM

## 2023-05-10 PROCEDURE — 1159F PR MEDICATION LIST DOCUMENTED IN MEDICAL RECORD: ICD-10-PCS | Mod: CPTII,S$GLB,, | Performed by: SPECIALIST

## 2023-05-10 PROCEDURE — 1160F RVW MEDS BY RX/DR IN RCRD: CPT | Mod: CPTII,S$GLB,, | Performed by: SPECIALIST

## 2023-05-10 PROCEDURE — 1160F PR REVIEW ALL MEDS BY PRESCRIBER/CLIN PHARMACIST DOCUMENTED: ICD-10-PCS | Mod: CPTII,S$GLB,, | Performed by: SPECIALIST

## 2023-05-10 PROCEDURE — 99024 POSTOP FOLLOW-UP VISIT: CPT | Mod: S$GLB,,, | Performed by: SPECIALIST

## 2023-05-10 PROCEDURE — 99024 PR POST-OP FOLLOW-UP VISIT: ICD-10-PCS | Mod: S$GLB,,, | Performed by: SPECIALIST

## 2023-05-10 PROCEDURE — 1159F MED LIST DOCD IN RCRD: CPT | Mod: CPTII,S$GLB,, | Performed by: SPECIALIST

## 2023-05-10 PROCEDURE — 1126F AMNT PAIN NOTED NONE PRSNT: CPT | Mod: CPTII,S$GLB,, | Performed by: SPECIALIST

## 2023-05-10 PROCEDURE — 1126F PR PAIN SEVERITY QUANTIFIED, NO PAIN PRESENT: ICD-10-PCS | Mod: CPTII,S$GLB,, | Performed by: SPECIALIST

## 2023-05-30 ENCOUNTER — OFFICE VISIT (OUTPATIENT)
Dept: SURGERY | Facility: CLINIC | Age: 78
End: 2023-05-30
Payer: MEDICARE

## 2023-05-30 DIAGNOSIS — Z17.0 MALIGNANT NEOPLASM OF LOWER-OUTER QUADRANT OF LEFT BREAST OF FEMALE, ESTROGEN RECEPTOR POSITIVE: ICD-10-CM

## 2023-05-30 DIAGNOSIS — C50.512 MALIGNANT NEOPLASM OF LOWER-OUTER QUADRANT OF LEFT BREAST OF FEMALE, ESTROGEN RECEPTOR POSITIVE: ICD-10-CM

## 2023-05-30 DIAGNOSIS — N64.89 SEROMA OF BREAST: ICD-10-CM

## 2023-05-30 PROCEDURE — 10140 PR DRAINAGE OF HEMATOMA/FLUID: ICD-10-PCS | Mod: S$GLB,,, | Performed by: SPECIALIST

## 2023-05-30 PROCEDURE — 99024 PR POST-OP FOLLOW-UP VISIT: ICD-10-PCS | Mod: S$GLB,,, | Performed by: SPECIALIST

## 2023-05-30 PROCEDURE — 1159F PR MEDICATION LIST DOCUMENTED IN MEDICAL RECORD: ICD-10-PCS | Mod: CPTII,S$GLB,, | Performed by: SPECIALIST

## 2023-05-30 PROCEDURE — 10140 I&D HMTMA SEROMA/FLUID COLLJ: CPT | Mod: S$GLB,,, | Performed by: SPECIALIST

## 2023-05-30 PROCEDURE — 1160F RVW MEDS BY RX/DR IN RCRD: CPT | Mod: CPTII,S$GLB,, | Performed by: SPECIALIST

## 2023-05-30 PROCEDURE — 99024 POSTOP FOLLOW-UP VISIT: CPT | Mod: S$GLB,,, | Performed by: SPECIALIST

## 2023-05-30 PROCEDURE — 1159F MED LIST DOCD IN RCRD: CPT | Mod: CPTII,S$GLB,, | Performed by: SPECIALIST

## 2023-05-30 PROCEDURE — 1160F PR REVIEW ALL MEDS BY PRESCRIBER/CLIN PHARMACIST DOCUMENTED: ICD-10-PCS | Mod: CPTII,S$GLB,, | Performed by: SPECIALIST

## 2023-05-30 NOTE — PROGRESS NOTES
[facility]  Jimy Matthews MD, FACS  Hayden Trevizo, SHILPA-C      Chief Complaint:   Nathaly Chaudhary is a 78 y.o. female who presents for a post operative evaluation.  She has noticed redness and itching to the sentinel lymph node site since Saturday.  She was seen by Dr. Carranza  today and he sent her for evaluation.    History of Present Illness:   Nathaly Chaudhary was diagnosed with left breast cancer in April 2023 at the age of 77. She elected lumpectomy that showed a 1.9 cm Grade II IDC with negative margins and a negative SLN.  T1cN0.  ER/NC Positive and HER 2 FISH negative.  MD:::Mery Perez NP; Beka Melton MD; Kendall Carranza MD; Plastics    Past Medical History:   Diagnosis Date    Malignant neoplasm of lower-outer quadrant of left breast of female, estrogen receptor positive 4/10/2023    Mass of lower outer quadrant of left breast 3/31/2023    Seroma of breast 5/30/2023      Past Surgical History:   Procedure Laterality Date    BREAST LUMPECTOMY W/ NEEDLE LOCALIZATION Left 05/04/2023    with SLN Biopsy and OncoPlastic (reduction/lift)        Current Outpatient Medications:     albuterol (PROVENTIL/VENTOLIN HFA) 90 mcg/actuation inhaler, ProAir HFA Take No date recorded No form recorded No frequency recorded No route recorded No set duration recorded No set duration amount recorded active No dosage strength recorded No dosage strength units of measure recorded, Disp: , Rfl:     aspirin 81 MG Chew, Take 81 mg by mouth., Disp: , Rfl:     budesonide (PULMICORT) 0.25 mg/2 mL nebulizer solution, Inhale into the lungs., Disp: , Rfl:     celecoxib (CELEBREX) 100 MG capsule, celecoxib Take No date recorded No form recorded No frequency recorded No route recorded No set duration recorded No set duration amount recorded active No dosage strength recorded No dosage strength units of measure recorded, Disp: , Rfl:     cetirizine 0.24 % Dpet, cetirizine Take No date recorded No form  recorded No frequency recorded No route recorded No set duration recorded No set duration amount recorded active No dosage strength recorded No dosage strength units of measure recorded, Disp: , Rfl:     cholecalciferol, vitamin D3, 10 mcg (400 unit) Cap capsule, Vitamin D3 Take No date recorded No form recorded No frequency recorded No route recorded No set duration recorded No set duration amount recorded active No dosage strength recorded No dosage strength units of measure recorded, Disp: , Rfl:     diclofenac (VOLTAREN) 75 MG EC tablet, diclofenac sodium Take 2 times per day No date recorded tablet,delayed release (DR/EC) 2 times per day No route recorded No set duration recorded No set duration amount recorded active 75 mg, Disp: , Rfl:     esomeprazole (NEXIUM) 40 MG capsule, esomeprazole magnesium Take 1 time per day No date recorded capsule,delayed release(DR/EC) 1 time per day No route recorded No set duration recorded No set duration amount recorded active 40 mg, Disp: , Rfl:     fexofenadine (ALLEGRA) 180 MG tablet, fexofenadine Take No date recorded No form recorded No frequency recorded No route recorded No set duration recorded No set duration amount recorded suspended No dosage strength recorded No dosage strength units of measure recorded, Disp: , Rfl:     fluticasone-umeclidin-vilanter (TRELEGY ELLIPTA) 100-62.5-25 mcg DsDv, Inhale 1 puff into the lungs once daily., Disp: , Rfl:     gabapentin (NEURONTIN) 100 MG capsule, Take by mouth., Disp: , Rfl:     levalbuterol (XOPENEX) 0.31 mg/3 mL nebulizer solution, Inhale into the lungs., Disp: , Rfl:     LINZESS 290 mcg Cap capsule, Take by mouth., Disp: , Rfl:     lisinopriL (PRINIVIL,ZESTRIL) 20 MG tablet, lisinopril Take 1 time per day No date recorded tablet 1 time per day No route recorded No set duration recorded No set duration amount recorded active 20 mg, Disp: , Rfl:     losartan-hydrochlorothiazide 100-12.5 mg (HYZAAR) 100-12.5 mg Tab, Take by  mouth., Disp: , Rfl:     metFORMIN (GLUCOPHAGE) 500 MG tablet, metformin Take 2 times per day No date recorded tablet 2 times per day No route recorded No set duration recorded No set duration amount recorded active 500 mg, Disp: , Rfl:     metoprolol succinate (TOPROL-XL) 25 MG 24 hr tablet, metoprolol succinate Take 1 time per day No date recorded tablet extended release 24 hr 1 time per day No route recorded No set duration recorded No set duration amount recorded active 25 mg, Disp: , Rfl:     montelukast (SINGULAIR) 10 mg tablet, Take by mouth., Disp: , Rfl:     predniSONE (DELTASONE) 10 MG tablet, Take by mouth., Disp: , Rfl:     rosuvastatin (CRESTOR) 20 MG tablet, Take by mouth., Disp: , Rfl:     tiotropium (SPIRIVA) 18 mcg inhalation capsule, Spiriva with HandiHaler Take No date recorded No form recorded No frequency recorded No route recorded No set duration recorded No set duration amount recorded suspended No dosage strength recorded No dosage strength units of measure recorded, Disp: , Rfl:     triamcinolone (KENALOG) 0.5 % ointment, Apply topically., Disp: , Rfl:    Review of patient's allergies indicates:   Allergen Reactions    Adhesive tape-silicones     Tetanus vaccines and toxoid       Social History     Tobacco Use    Smoking status: Never    Smokeless tobacco: Never   Substance Use Topics    Alcohol use: Never      History reviewed. No pertinent family history.     Review of Systems   Constitutional:  Positive for activity change (slowed down after surgery). Negative for fever.   Respiratory:  Negative for shortness of breath.    Cardiovascular:  Negative for chest pain.   Integumentary:  Negative for color change, rash, wound, mole/lesion, breast mass, breast discharge and breast tenderness.   Breast: Negative for mass and tenderness     Physical Exam   Constitutional: She appears well-developed. She is cooperative.   HENT:   Head: Normocephalic.   Cardiovascular:  Normal rate and regular  rhythm.            Pulmonary/Chest: Effort normal. She exhibits no tenderness, no bony tenderness and no swelling. Right breast exhibits no mass, no nipple discharge, no skin change and no tenderness. Left breast exhibits skin change. Left breast exhibits no mass, no nipple discharge and no tenderness.       Abdominal: Soft. Normal appearance.   Musculoskeletal: Lymphadenopathy:      Upper Body:      Right upper body: No supraclavicular or axillary adenopathy.      Left upper body: No supraclavicular or axillary adenopathy.     Neurological: She is alert.   Skin: No rash noted.     Seroma Drainage - Procedure in Detail  Proper informed consent was obtained. The seroma was identified by palpation. Lidocaine without epinephrine was injected. An #11 blade was inserted and used to open her incision (for about 10.5 cm). and the seroma was drained completely. Pressure was held until no oozing was noted. 1/4 inch Nugauze was packed in the incision.  The area was dressed in a sterile fashion and instructions were given. She tolerated this procedure well and she was told to contact us should the need arise.      ASSESSMENT and PLAN of CARE    1. Seroma of breast  Assessment & Plan:  She has developed erythema around her sentinel lymph node scar.  It is fairly impressive.  She has very little pain but does have itching in this area.  The decision was made to open this incision as described below.  Serous fluid only was realized with no purulence.  She was given specific wound care instructions and told to call us for any changes.  She will see us back in 1 week and p.r.n..      2. Malignant neoplasm of lower-outer quadrant of left breast of female, estrogen receptor positive  Assessment & Plan:   Patient is doing well and is being followed according to NCCN Guidelines. She understands that her imaging and exams have remained stable and is comfortable being followed in a conservative fashion. She understands the importance of  monthly self-breast examination and knows to report any and all changes as they occur.          Medical Decision Making:  It is my impression that this patient suffers all conditions contained in this medical document.  Each of these conditions did affect our plan of care and my medical decision making today.  It is my opinion that the medical decision making concerning this patient was of minimal  difficulty based on the aforementioned conditions.  Any further recommendations will be communicated to the patient by me.  I have reviewed and verified her allergies, list of medications, medical and surgical histories, social history, and a pertinent review of symptoms.    Follow up:  3 months for PE

## 2023-05-30 NOTE — ASSESSMENT & PLAN NOTE
She has developed erythema around her sentinel lymph node scar.  It is fairly impressive.  She has very little pain but does have itching in this area.  The decision was made to open this incision as described below.  Serous fluid only was realized with no purulence.  She was given specific wound care instructions and told to call us for any changes.  She will see us back in 1 week and p.r.n..

## 2023-06-02 ENCOUNTER — NURSE TRIAGE (OUTPATIENT)
Dept: ADMINISTRATIVE | Facility: CLINIC | Age: 78
End: 2023-06-02
Payer: MEDICARE

## 2023-06-02 NOTE — TELEPHONE ENCOUNTER
Pt states that she was at Dr. Matthews's office on Wednesday where she had a seroma of her left breast drained. She calls tonight asking if she can bathe or if she must continue to refrain from doing so. Per Dr. Matthews's note, specific wound care instructions were given to pt, however, they are not documented. OCP called for further guidance.     Dr. Diaz advises that pt should not get her wound wet, but that it is okay for her to submerge her body BELOW the wound in water. Pt is informed. She verbalizes understanding and is instructed to call back with any new/worsening sxs, questions, or concerns.   Reason for Disposition   Nursing judgment    Protocols used: No Protocol [id number]

## 2023-06-06 NOTE — ASSESSMENT & PLAN NOTE
She will continue  to be followed according to NCCN guidelines. She will continue her Bactrim DS and this area should completley heal. She can start radiation in one week. I will reach out to Dr. Carranza's office today.

## 2023-06-06 NOTE — PROGRESS NOTES
[facility]  Jimy Matthews MD, FACS  Hayden Trevizo, SHILPA-C    Chief Complaint:   Nathaly Chaudhary is a 78 y.o. female presenting today to follow up on her SLN site erythema. Her Seroma was drained 5/30/23 and her symptoms have improved. The last of her packing was removed this a.m.    History of Present Illness:   Nathaly Chaudhary was diagnosed with left breast cancer in April 2023 at the age of 77. She elected lumpectomy that showed a 1.9 cm Grade II IDC with negative margins and a negative SLN.  T1cN0.  ER/OK Positive and HER 2 FISH negative.  MD:::Mery Perez NP; Beka Melton MD; Kendall Carranza MD; Plastics  Past Medical History:   Diagnosis Date    Malignant neoplasm of lower-outer quadrant of left breast of female, estrogen receptor positive 4/10/2023    Mass of lower outer quadrant of left breast 3/31/2023    Seroma of breast 5/30/2023      Past Surgical History:   Procedure Laterality Date    BREAST LUMPECTOMY W/ NEEDLE LOCALIZATION Left 05/04/2023    with SLN Biopsy and OncoPlastic (reduction/lift)        Current Outpatient Medications:     albuterol (PROVENTIL/VENTOLIN HFA) 90 mcg/actuation inhaler, ProAir HFA Take No date recorded No form recorded No frequency recorded No route recorded No set duration recorded No set duration amount recorded active No dosage strength recorded No dosage strength units of measure recorded, Disp: , Rfl:     aspirin 81 MG Chew, Take 81 mg by mouth., Disp: , Rfl:     budesonide (PULMICORT) 0.25 mg/2 mL nebulizer solution, Inhale into the lungs., Disp: , Rfl:     celecoxib (CELEBREX) 100 MG capsule, celecoxib Take No date recorded No form recorded No frequency recorded No route recorded No set duration recorded No set duration amount recorded active No dosage strength recorded No dosage strength units of measure recorded, Disp: , Rfl:     cetirizine 0.24 % Dpet, cetirizine Take No date recorded No form recorded No frequency recorded No route  recorded No set duration recorded No set duration amount recorded active No dosage strength recorded No dosage strength units of measure recorded, Disp: , Rfl:     cholecalciferol, vitamin D3, 10 mcg (400 unit) Cap capsule, Vitamin D3 Take No date recorded No form recorded No frequency recorded No route recorded No set duration recorded No set duration amount recorded active No dosage strength recorded No dosage strength units of measure recorded, Disp: , Rfl:     diclofenac (VOLTAREN) 75 MG EC tablet, diclofenac sodium Take 2 times per day No date recorded tablet,delayed release (DR/EC) 2 times per day No route recorded No set duration recorded No set duration amount recorded active 75 mg, Disp: , Rfl:     esomeprazole (NEXIUM) 40 MG capsule, esomeprazole magnesium Take 1 time per day No date recorded capsule,delayed release(DR/EC) 1 time per day No route recorded No set duration recorded No set duration amount recorded active 40 mg, Disp: , Rfl:     fexofenadine (ALLEGRA) 180 MG tablet, fexofenadine Take No date recorded No form recorded No frequency recorded No route recorded No set duration recorded No set duration amount recorded suspended No dosage strength recorded No dosage strength units of measure recorded, Disp: , Rfl:     fluticasone-umeclidin-vilanter (TRELEGY ELLIPTA) 100-62.5-25 mcg DsDv, Inhale 1 puff into the lungs once daily., Disp: , Rfl:     gabapentin (NEURONTIN) 100 MG capsule, Take by mouth., Disp: , Rfl:     levalbuterol (XOPENEX) 0.31 mg/3 mL nebulizer solution, Inhale into the lungs., Disp: , Rfl:     LINZESS 290 mcg Cap capsule, Take by mouth., Disp: , Rfl:     lisinopriL (PRINIVIL,ZESTRIL) 20 MG tablet, lisinopril Take 1 time per day No date recorded tablet 1 time per day No route recorded No set duration recorded No set duration amount recorded active 20 mg, Disp: , Rfl:     losartan-hydrochlorothiazide 100-12.5 mg (HYZAAR) 100-12.5 mg Tab, Take by mouth., Disp: , Rfl:     metFORMIN  (GLUCOPHAGE) 500 MG tablet, metformin Take 2 times per day No date recorded tablet 2 times per day No route recorded No set duration recorded No set duration amount recorded active 500 mg, Disp: , Rfl:     metoprolol succinate (TOPROL-XL) 25 MG 24 hr tablet, metoprolol succinate Take 1 time per day No date recorded tablet extended release 24 hr 1 time per day No route recorded No set duration recorded No set duration amount recorded active 25 mg, Disp: , Rfl:     montelukast (SINGULAIR) 10 mg tablet, Take by mouth., Disp: , Rfl:     predniSONE (DELTASONE) 10 MG tablet, Take by mouth., Disp: , Rfl:     rosuvastatin (CRESTOR) 20 MG tablet, Take by mouth., Disp: , Rfl:     sulfamethoxazole-trimethoprim 800-160mg (BACTRIM DS) 800-160 mg Tab, Take 1 tablet by mouth 2 (two) times daily., Disp: 20 tablet, Rfl: 0    tiotropium (SPIRIVA) 18 mcg inhalation capsule, Spiriva with HandiHaler Take No date recorded No form recorded No frequency recorded No route recorded No set duration recorded No set duration amount recorded suspended No dosage strength recorded No dosage strength units of measure recorded, Disp: , Rfl:     triamcinolone (KENALOG) 0.5 % ointment, Apply topically., Disp: , Rfl:    Review of patient's allergies indicates:   Allergen Reactions    Adhesive tape-silicones     Tetanus vaccines and toxoid       Social History     Tobacco Use    Smoking status: Never    Smokeless tobacco: Never   Substance Use Topics    Alcohol use: Never      History reviewed. No pertinent family history.     Review of Systems   Integumentary:  Negative for color change, rash, mole/lesion, breast mass, breast discharge and breast tenderness.   Breast: Negative for mass and tenderness     Physical Exam   Pulmonary/Chest: Effort normal. She exhibits tenderness. She exhibits no swelling. Left breast exhibits skin change (small open area to SLN biopsy site where packing was removed. No erthema noted today.).   Abdominal: Normal appearance.    Skin: No rash noted.          ASSESSMENT and PLAN OF CARE     1. Seroma of breast  Assessment & Plan:  We discussed post operative seromas and how they can be chronic.  She knows that these can be aspirated as needed for symptomatic relief and should report any issues.  She knows that her body should reabsorb this fluid naturally and that having a seroma is normal after surgery.  We discussed conservative treatment of this issue at length.  She knows to call us for any worsening of symptoms such as: pain, redness, drainage, bulging, etc.        2. Malignant neoplasm of lower-outer quadrant of left breast of female, estrogen receptor positive  Assessment & Plan:  She will continue  to be followed according to NCCN guidelines. She will continue her Bactrim DS and this area should completley heal. She can start radiation in one week. I will reach out to Dr. Carranza's office today.      Other orders  -     sulfamethoxazole-trimethoprim 800-160mg (BACTRIM DS) 800-160 mg Tab; Take 1 tablet by mouth 2 (two) times daily.  Dispense: 20 tablet; Refill: 0       Medical Decision Making:  It is my impression that this patient suffers all conditions contained in this medical document.  Each of these conditions did affect our plan of care and my medical decision making today.  It is my opinion that the medical decision making concerning this patient was of minimal  difficulty based on the aforementioned conditions.  Any further recommendations will be communicated to the patient by me.  I have reviewed and verified her allergies, list of medications, medical and surgical histories, social history, and a pertinent review of symptoms.     Follow up:    Keep schedule appointment and PRN

## 2023-06-06 NOTE — ASSESSMENT & PLAN NOTE
We discussed post operative seromas and how they can be chronic.  She knows that these can be aspirated as needed for symptomatic relief and should report any issues.  She knows that her body should reabsorb this fluid naturally and that having a seroma is normal after surgery.  We discussed conservative treatment of this issue at length.  She knows to call us for any worsening of symptoms such as: pain, redness, drainage, bulging, etc.

## 2023-06-07 ENCOUNTER — OFFICE VISIT (OUTPATIENT)
Dept: SURGERY | Facility: CLINIC | Age: 78
End: 2023-06-07
Payer: MEDICARE

## 2023-06-07 DIAGNOSIS — C50.512 MALIGNANT NEOPLASM OF LOWER-OUTER QUADRANT OF LEFT BREAST OF FEMALE, ESTROGEN RECEPTOR POSITIVE: ICD-10-CM

## 2023-06-07 DIAGNOSIS — N64.89 SEROMA OF BREAST: Primary | ICD-10-CM

## 2023-06-07 DIAGNOSIS — Z17.0 MALIGNANT NEOPLASM OF LOWER-OUTER QUADRANT OF LEFT BREAST OF FEMALE, ESTROGEN RECEPTOR POSITIVE: ICD-10-CM

## 2023-06-07 PROCEDURE — 99024 POSTOP FOLLOW-UP VISIT: CPT | Mod: S$GLB,,, | Performed by: NURSE PRACTITIONER

## 2023-06-07 PROCEDURE — 1159F MED LIST DOCD IN RCRD: CPT | Mod: CPTII,S$GLB,, | Performed by: NURSE PRACTITIONER

## 2023-06-07 PROCEDURE — 99024 PR POST-OP FOLLOW-UP VISIT: ICD-10-PCS | Mod: S$GLB,,, | Performed by: NURSE PRACTITIONER

## 2023-06-07 PROCEDURE — 1159F PR MEDICATION LIST DOCUMENTED IN MEDICAL RECORD: ICD-10-PCS | Mod: CPTII,S$GLB,, | Performed by: NURSE PRACTITIONER

## 2023-06-07 PROCEDURE — 1160F PR REVIEW ALL MEDS BY PRESCRIBER/CLIN PHARMACIST DOCUMENTED: ICD-10-PCS | Mod: CPTII,S$GLB,, | Performed by: NURSE PRACTITIONER

## 2023-06-07 PROCEDURE — 1160F RVW MEDS BY RX/DR IN RCRD: CPT | Mod: CPTII,S$GLB,, | Performed by: NURSE PRACTITIONER

## 2023-06-07 RX ORDER — SULFAMETHOXAZOLE AND TRIMETHOPRIM 800; 160 MG/1; MG/1
1 TABLET ORAL 2 TIMES DAILY
Qty: 20 TABLET | Refills: 0 | Status: SHIPPED | OUTPATIENT
Start: 2023-06-07

## 2023-06-07 RX ORDER — SULFAMETHOXAZOLE AND TRIMETHOPRIM 800; 160 MG/1; MG/1
1 TABLET ORAL 2 TIMES DAILY
COMMUNITY
End: 2023-06-07 | Stop reason: SDUPTHER

## 2023-06-16 ENCOUNTER — TELEPHONE (OUTPATIENT)
Dept: SURGERY | Facility: CLINIC | Age: 78
End: 2023-06-16
Payer: MEDICARE

## 2023-06-16 NOTE — TELEPHONE ENCOUNTER
What is the nature of the call?: Patient states that she would like to speak with someone to see when she needs to discontinue wearing the bra that she has on.     Pt called wanting to know if she could stop wearing the tight bra, pt was told she could stop wearing the bra if she is not having fluid in that area, per Hayden Trevizo , pt understood

## 2023-07-21 ENCOUNTER — TELEPHONE (OUTPATIENT)
Dept: SURGERY | Facility: CLINIC | Age: 78
End: 2023-07-21
Payer: MEDICARE

## 2023-08-23 NOTE — ASSESSMENT & PLAN NOTE
Since she has completed her treatment course, we discussed her required follow up with will include every 3 month Physical Examinations.  We will also obtain labs starting in 3 months and we will obtain these each time she is seen.  We will also get imaging at routine intervals.      She should start her Tamoxifen today as directed.  We discussed some of the side effects and methods to combat these.  Should she have any issues, she can contact us as needed.

## 2023-08-23 NOTE — PROGRESS NOTES
[facility]  Jimy Matthews MD, FACS  Hayden Trevizo, NP-C      Chief Complaint:   Nathaly Chaudhary is a 78 y.o. female presenting today for her 3 month follow up to her breast cancer diagnosis.  She is due for a Physical Examination.  She reports no interval changes except those seen after having radiation.    She tolerated adjuvant Radiation well.    History of Present Illness:   Nathaly Chaudhary was diagnosed with left breast cancer in April 2023 at the age of 77. She elected lumpectomy that showed a 1.9 cm Grade II IDC with negative margins and a negative SLN.  T1cN0. She completed adjuvant Radiation and will soon start Tamoxifen.  ER/OR Positive and HER 2 FISH negative.  MD:::Mery Perez NP; Beka Melton MD; Kendall Carranza MD; Plastics    Past Medical History:   Diagnosis Date    Malignant neoplasm of lower-outer quadrant of left breast of female, estrogen receptor positive 4/10/2023    Mass of lower outer quadrant of left breast 3/31/2023    Seroma of breast 5/30/2023      Past Surgical History:   Procedure Laterality Date    BREAST LUMPECTOMY W/ NEEDLE LOCALIZATION Left 05/04/2023    with SLN Biopsy and OncoPlastic (reduction/lift)        Current Outpatient Medications:     albuterol (PROVENTIL/VENTOLIN HFA) 90 mcg/actuation inhaler, ProAir HFA Take No date recorded No form recorded No frequency recorded No route recorded No set duration recorded No set duration amount recorded active No dosage strength recorded No dosage strength units of measure recorded, Disp: , Rfl:     aspirin 81 MG Chew, Take 81 mg by mouth., Disp: , Rfl:     budesonide (PULMICORT) 0.25 mg/2 mL nebulizer solution, Inhale into the lungs., Disp: , Rfl:     celecoxib (CELEBREX) 100 MG capsule, celecoxib Take No date recorded No form recorded No frequency recorded No route recorded No set duration recorded No set duration amount recorded active No dosage strength recorded No dosage strength units of  measure recorded, Disp: , Rfl:     cetirizine 0.24 % Dpet, cetirizine Take No date recorded No form recorded No frequency recorded No route recorded No set duration recorded No set duration amount recorded active No dosage strength recorded No dosage strength units of measure recorded, Disp: , Rfl:     cholecalciferol, vitamin D3, 10 mcg (400 unit) Cap capsule, Vitamin D3 Take No date recorded No form recorded No frequency recorded No route recorded No set duration recorded No set duration amount recorded active No dosage strength recorded No dosage strength units of measure recorded, Disp: , Rfl:     diclofenac (VOLTAREN) 75 MG EC tablet, diclofenac sodium Take 2 times per day No date recorded tablet,delayed release (DR/EC) 2 times per day No route recorded No set duration recorded No set duration amount recorded active 75 mg, Disp: , Rfl:     esomeprazole (NEXIUM) 40 MG capsule, esomeprazole magnesium Take 1 time per day No date recorded capsule,delayed release(DR/EC) 1 time per day No route recorded No set duration recorded No set duration amount recorded active 40 mg, Disp: , Rfl:     fexofenadine (ALLEGRA) 180 MG tablet, fexofenadine Take No date recorded No form recorded No frequency recorded No route recorded No set duration recorded No set duration amount recorded suspended No dosage strength recorded No dosage strength units of measure recorded, Disp: , Rfl:     fluticasone-umeclidin-vilanter (TRELEGY ELLIPTA) 100-62.5-25 mcg DsDv, Inhale 1 puff into the lungs once daily., Disp: , Rfl:     gabapentin (NEURONTIN) 100 MG capsule, Take by mouth., Disp: , Rfl:     levalbuterol (XOPENEX) 0.31 mg/3 mL nebulizer solution, Inhale into the lungs., Disp: , Rfl:     LINZESS 290 mcg Cap capsule, Take by mouth., Disp: , Rfl:     lisinopriL (PRINIVIL,ZESTRIL) 20 MG tablet, lisinopril Take 1 time per day No date recorded tablet 1 time per day No route recorded No set duration recorded No set duration amount recorded  active 20 mg, Disp: , Rfl:     losartan-hydrochlorothiazide 100-12.5 mg (HYZAAR) 100-12.5 mg Tab, Take by mouth., Disp: , Rfl:     metFORMIN (GLUCOPHAGE) 500 MG tablet, metformin Take 2 times per day No date recorded tablet 2 times per day No route recorded No set duration recorded No set duration amount recorded active 500 mg, Disp: , Rfl:     metoprolol succinate (TOPROL-XL) 25 MG 24 hr tablet, metoprolol succinate Take 1 time per day No date recorded tablet extended release 24 hr 1 time per day No route recorded No set duration recorded No set duration amount recorded active 25 mg, Disp: , Rfl:     montelukast (SINGULAIR) 10 mg tablet, Take by mouth., Disp: , Rfl:     predniSONE (DELTASONE) 10 MG tablet, Take by mouth., Disp: , Rfl:     rosuvastatin (CRESTOR) 20 MG tablet, Take by mouth., Disp: , Rfl:     sulfamethoxazole-trimethoprim 800-160mg (BACTRIM DS) 800-160 mg Tab, Take 1 tablet by mouth 2 (two) times daily., Disp: 20 tablet, Rfl: 0    tiotropium (SPIRIVA) 18 mcg inhalation capsule, Spiriva with HandiHaler Take No date recorded No form recorded No frequency recorded No route recorded No set duration recorded No set duration amount recorded suspended No dosage strength recorded No dosage strength units of measure recorded, Disp: , Rfl:     triamcinolone (KENALOG) 0.5 % ointment, Apply topically., Disp: , Rfl:     tamoxifen (NOLVADEX) 20 MG Tab, Take 1 tablet by mouth every morning., Disp: , Rfl:    Review of patient's allergies indicates:   Allergen Reactions    Adhesive tape-silicones     Tetanus vaccines and toxoid       Social History     Tobacco Use    Smoking status: Never    Smokeless tobacco: Never   Substance Use Topics    Alcohol use: Never      Family History   Problem Relation Age of Onset    Breast cancer Neg Hx     Ovarian cancer Neg Hx         Review of Systems   Integumentary:  Negative for color change, rash, mole/lesion, breast mass, breast discharge and breast tenderness.   Breast:  Negative for mass and tenderness       Physical Exam   Constitutional: She is cooperative.   HENT:   Head: Normocephalic.   Pulmonary/Chest: She exhibits no mass. Right breast exhibits no inverted nipple, no mass, no nipple discharge, no skin change and no tenderness. Left breast exhibits no inverted nipple, no mass, no nipple discharge, no skin change and no tenderness.   Abdominal: Normal appearance.   Musculoskeletal: Lymphadenopathy:      Upper Body:      Right upper body: No supraclavicular or axillary adenopathy.      Left upper body: No supraclavicular or axillary adenopathy.     Neurological: She is alert.   Skin: No rash noted.          ASSESSMENT and PLAN OF CARE     1. Malignant neoplasm of lower-outer quadrant of left breast of female, estrogen receptor positive  Assessment & Plan:  Since she has completed her treatment course, we discussed her required follow up with will include every 3 month Physical Examinations.  We will also obtain labs starting in 3 months and we will obtain these each time she is seen.  We will also get imaging at routine intervals.      She should start her Tamoxifen today as directed.  We discussed some of the side effects and methods to combat these.  Should she have any issues, she can contact us as needed.            Medical Decision Making: It is my impression that this patient suffers all conditions contained in this medical document.  Each of these conditions did affect our plan of care and my medical decision making today.  It is my opinion that the medical decision making concerning this patient was of moderate difficulty based on the aforementioned conditions.  Any further recommendations will be communicated to the patient by me.  I have reviewed and verified her allergies, list of medications, medical and surgical histories, social history, and a pertinent review of symptoms.    Follow up:  3 Months and PRN     For: PER VIZCARRA (D) #1 of 3 at [facility]

## 2023-09-06 ENCOUNTER — OFFICE VISIT (OUTPATIENT)
Dept: SURGERY | Facility: CLINIC | Age: 78
End: 2023-09-06
Payer: MEDICARE

## 2023-09-06 DIAGNOSIS — C50.512 MALIGNANT NEOPLASM OF LOWER-OUTER QUADRANT OF LEFT BREAST OF FEMALE, ESTROGEN RECEPTOR POSITIVE: Primary | ICD-10-CM

## 2023-09-06 DIAGNOSIS — Z17.0 MALIGNANT NEOPLASM OF LOWER-OUTER QUADRANT OF LEFT BREAST OF FEMALE, ESTROGEN RECEPTOR POSITIVE: Primary | ICD-10-CM

## 2023-09-06 PROCEDURE — 1160F PR REVIEW ALL MEDS BY PRESCRIBER/CLIN PHARMACIST DOCUMENTED: ICD-10-PCS | Mod: CPTII,S$GLB,, | Performed by: SPECIALIST

## 2023-09-06 PROCEDURE — 1126F PR PAIN SEVERITY QUANTIFIED, NO PAIN PRESENT: ICD-10-PCS | Mod: CPTII,S$GLB,, | Performed by: SPECIALIST

## 2023-09-06 PROCEDURE — 99214 PR OFFICE/OUTPT VISIT, EST, LEVL IV, 30-39 MIN: ICD-10-PCS | Mod: S$GLB,,, | Performed by: SPECIALIST

## 2023-09-06 PROCEDURE — 1159F MED LIST DOCD IN RCRD: CPT | Mod: CPTII,S$GLB,, | Performed by: SPECIALIST

## 2023-09-06 PROCEDURE — 1126F AMNT PAIN NOTED NONE PRSNT: CPT | Mod: CPTII,S$GLB,, | Performed by: SPECIALIST

## 2023-09-06 PROCEDURE — 1159F PR MEDICATION LIST DOCUMENTED IN MEDICAL RECORD: ICD-10-PCS | Mod: CPTII,S$GLB,, | Performed by: SPECIALIST

## 2023-09-06 PROCEDURE — 1160F RVW MEDS BY RX/DR IN RCRD: CPT | Mod: CPTII,S$GLB,, | Performed by: SPECIALIST

## 2023-09-06 PROCEDURE — 99214 OFFICE O/P EST MOD 30 MIN: CPT | Mod: S$GLB,,, | Performed by: SPECIALIST

## 2023-09-06 RX ORDER — TAMOXIFEN CITRATE 20 MG/1
1 TABLET ORAL EVERY MORNING
COMMUNITY
Start: 2023-08-17

## 2023-09-15 ENCOUNTER — TELEPHONE (OUTPATIENT)
Dept: SURGERY | Facility: CLINIC | Age: 78
End: 2023-09-15
Payer: MEDICARE

## 2023-12-24 DIAGNOSIS — Z17.0 MALIGNANT NEOPLASM OF LOWER-OUTER QUADRANT OF LEFT BREAST OF FEMALE, ESTROGEN RECEPTOR POSITIVE: Primary | ICD-10-CM

## 2023-12-24 DIAGNOSIS — C50.512 MALIGNANT NEOPLASM OF LOWER-OUTER QUADRANT OF LEFT BREAST OF FEMALE, ESTROGEN RECEPTOR POSITIVE: Primary | ICD-10-CM

## 2023-12-24 NOTE — PROGRESS NOTES
Date of Service: 1/10/2024    Chief Complaint:   Nathaly Chaudhary is a 78 y.o. female presenting today for her 3-month follow up due to her breast cancer diagnosis.  She is due for her Mammogram (#1 of 3).  She reports no interval changes on her self-breast examination.     History of Present Illness:   Nathaly Chaudhary was diagnosed with left breast cancer in April 2023 at the age of 77. She elected lumpectomy that showed a 1.9 cm Grade II IDC with negative margins and a negative SLN.  T1cN0. She completed adjuvant Radiation and started Tamoxifen in September 2023.  ER/AK Positive and HER 2 FISH negative.  MD:::Mery Perez NP; Beka Melton MD; Kendall Carranza MD; Plastics    Past Medical History:   Diagnosis Date    Malignant neoplasm of lower-outer quadrant of left breast of female, estrogen receptor positive 4/10/2023    Mass of lower outer quadrant of left breast 3/31/2023    Seroma of breast 5/30/2023      Past Surgical History:   Procedure Laterality Date    BREAST LUMPECTOMY W/ NEEDLE LOCALIZATION Left 05/04/2023    with SLN Biopsy and OncoPlastic (reduction/lift)        Current Outpatient Medications:     albuterol (PROVENTIL/VENTOLIN HFA) 90 mcg/actuation inhaler, ProAir HFA Take No date recorded No form recorded No frequency recorded No route recorded No set duration recorded No set duration amount recorded active No dosage strength recorded No dosage strength units of measure recorded, Disp: , Rfl:     aspirin 81 MG Chew, Take 81 mg by mouth., Disp: , Rfl:     budesonide (PULMICORT) 0.25 mg/2 mL nebulizer solution, Inhale into the lungs., Disp: , Rfl:     celecoxib (CELEBREX) 100 MG capsule, celecoxib Take No date recorded No form recorded No frequency recorded No route recorded No set duration recorded No set duration amount recorded active No dosage strength recorded No dosage strength units of measure recorded, Disp: , Rfl:     cetirizine 0.24 % Dpet, cetirizine Take No date recorded No form  recorded No frequency recorded No route recorded No set duration recorded No set duration amount recorded active No dosage strength recorded No dosage strength units of measure recorded, Disp: , Rfl:     cholecalciferol, vitamin D3, 10 mcg (400 unit) Cap capsule, Vitamin D3 Take No date recorded No form recorded No frequency recorded No route recorded No set duration recorded No set duration amount recorded active No dosage strength recorded No dosage strength units of measure recorded, Disp: , Rfl:     diclofenac (VOLTAREN) 75 MG EC tablet, diclofenac sodium Take 2 times per day No date recorded tablet,delayed release (DR/EC) 2 times per day No route recorded No set duration recorded No set duration amount recorded active 75 mg, Disp: , Rfl:     esomeprazole (NEXIUM) 40 MG capsule, esomeprazole magnesium Take 1 time per day No date recorded capsule,delayed release(DR/EC) 1 time per day No route recorded No set duration recorded No set duration amount recorded active 40 mg, Disp: , Rfl:     fexofenadine (ALLEGRA) 180 MG tablet, fexofenadine Take No date recorded No form recorded No frequency recorded No route recorded No set duration recorded No set duration amount recorded suspended No dosage strength recorded No dosage strength units of measure recorded, Disp: , Rfl:     fluticasone-umeclidin-vilanter (TRELEGY ELLIPTA) 100-62.5-25 mcg DsDv, Inhale 1 puff into the lungs once daily., Disp: , Rfl:     gabapentin (NEURONTIN) 100 MG capsule, Take by mouth., Disp: , Rfl:     levalbuterol (XOPENEX) 0.31 mg/3 mL nebulizer solution, Inhale into the lungs., Disp: , Rfl:     LINZESS 290 mcg Cap capsule, Take by mouth., Disp: , Rfl:     lisinopriL (PRINIVIL,ZESTRIL) 20 MG tablet, lisinopril Take 1 time per day No date recorded tablet 1 time per day No route recorded No set duration recorded No set duration amount recorded active 20 mg, Disp: , Rfl:     losartan-hydrochlorothiazide 100-12.5 mg (HYZAAR) 100-12.5 mg Tab, Take by  mouth., Disp: , Rfl:     metFORMIN (GLUCOPHAGE) 500 MG tablet, metformin Take 2 times per day No date recorded tablet 2 times per day No route recorded No set duration recorded No set duration amount recorded active 500 mg, Disp: , Rfl:     metoprolol succinate (TOPROL-XL) 25 MG 24 hr tablet, metoprolol succinate Take 1 time per day No date recorded tablet extended release 24 hr 1 time per day No route recorded No set duration recorded No set duration amount recorded active 25 mg, Disp: , Rfl:     montelukast (SINGULAIR) 10 mg tablet, Take by mouth., Disp: , Rfl:     predniSONE (DELTASONE) 10 MG tablet, Take by mouth., Disp: , Rfl:     rosuvastatin (CRESTOR) 20 MG tablet, Take by mouth., Disp: , Rfl:     sulfamethoxazole-trimethoprim 800-160mg (BACTRIM DS) 800-160 mg Tab, Take 1 tablet by mouth 2 (two) times daily., Disp: 20 tablet, Rfl: 0    tamoxifen (NOLVADEX) 20 MG Tab, Take 1 tablet by mouth every morning., Disp: , Rfl:     tiotropium (SPIRIVA) 18 mcg inhalation capsule, Spiriva with HandiHaler Take No date recorded No form recorded No frequency recorded No route recorded No set duration recorded No set duration amount recorded suspended No dosage strength recorded No dosage strength units of measure recorded, Disp: , Rfl:     triamcinolone (KENALOG) 0.5 % ointment, Apply topically., Disp: , Rfl:    Review of patient's allergies indicates:   Allergen Reactions    Adhesive tape-silicones     Tetanus vaccines and toxoid       Social History     Tobacco Use    Smoking status: Never    Smokeless tobacco: Never   Substance Use Topics    Alcohol use: Never      Family History   Problem Relation Age of Onset    Breast cancer Neg Hx     Ovarian cancer Neg Hx         Review of Systems   Integumentary:  Negative for color change, rash, mole/lesion, breast mass, breast discharge and breast tenderness.   Breast: Negative for mass and tenderness       Physical Exam   Constitutional: She is cooperative.   HENT:   Head:  Normocephalic.   Pulmonary/Chest: She exhibits no mass. Right breast exhibits no inverted nipple, no mass, no nipple discharge, no skin change and no tenderness. Left breast exhibits no inverted nipple, no mass, no nipple discharge, no skin change and no tenderness.   Abdominal: Normal appearance.   Musculoskeletal: Lymphadenopathy:      Upper Body:      Right upper body: No supraclavicular or axillary adenopathy.      Left upper body: No supraclavicular or axillary adenopathy.     Neurological: She is alert.   Skin: No rash noted.        MAMMOGRAM REPORT: She has some diffuse fibronodular tissue; there are no spiculated lesions, distortions or suspicious calcifications noted; NEM    ASSESSMENT and PLAN OF CARE     1. Malignant neoplasm of lower-outer quadrant of left breast of female, estrogen receptor positive  Assessment & Plan:  Patient is doing well and is being followed according to NCCN Guidelines. She understands that her imaging and exams have remained stable and is comfortable being followed in a conservative fashion. She understands the importance of monthly self-breast examination and knows to report any and all changes as they occur.    NOTE:::We viewed her films together at today's visit.  We discussed the multiple views obtained and the important findings.  Even benign changes were mentioned and her questions were answered.  She knows that she may receive a formal letter or report from the Radiologist.  She is to contact us if she has questions.    She should continue her Tamoxifen as directed.  We discussed some of the side effects and methods to combat these.  Should she have any issues, she can contact us as needed.    Labs obtained today: CBC, Chem 12, CEA, LDH, CA27/29 - after resulted, these will be compared to her previous values and all abnormal values will be phoned to her for discussion.        Medical Decision Making: HT - It is my impression that the patient suffers from all the listed  conditions.  Each of these conditions did affect my Plan of Care and all medical decisions that were rendered.  The medical decision making was of high difficulty based on her comorbidities and her previous diagnosis of breast cancer, which can pose a direct threat to her life.  This patient is currently being treated with ongoing Hormonal Agents that pose inherent risks. Laboratory evaluations are currently pending but will be reviewed in the next 24 hours. Any further recommendations will be communicated to the patient by me.  I have reviewed and verified her allergies, list of medications, medical and surgical histories, social history, and a pertinent review of symptoms.     Follow up:  3 months and prn  for Physical Exam

## 2024-01-10 ENCOUNTER — OFFICE VISIT (OUTPATIENT)
Dept: SURGERY | Facility: CLINIC | Age: 79
End: 2024-01-10
Payer: MEDICARE

## 2024-01-10 DIAGNOSIS — Z17.0 MALIGNANT NEOPLASM OF LOWER-OUTER QUADRANT OF LEFT BREAST OF FEMALE, ESTROGEN RECEPTOR POSITIVE: Primary | ICD-10-CM

## 2024-01-10 DIAGNOSIS — C50.512 MALIGNANT NEOPLASM OF LOWER-OUTER QUADRANT OF LEFT BREAST OF FEMALE, ESTROGEN RECEPTOR POSITIVE: Primary | ICD-10-CM

## 2024-01-10 LAB
ALBUMIN SERPL BCP-MCNC: 3.8 G/DL (ref 3.5–5.2)
ALP SERPL-CCNC: 52 U/L (ref 55–135)
ALT SERPL W/O P-5'-P-CCNC: 8 U/L (ref 10–44)
ANION GAP SERPL CALC-SCNC: 10 MMOL/L (ref 8–16)
AST SERPL-CCNC: 16 U/L (ref 10–40)
BASOPHILS # BLD AUTO: 0.02 K/UL (ref 0–0.2)
BASOPHILS NFR BLD: 0.6 % (ref 0–1.9)
BILIRUB SERPL-MCNC: 0.6 MG/DL (ref 0.1–1)
BUN SERPL-MCNC: 11 MG/DL (ref 8–23)
CALCIUM SERPL-MCNC: 9.4 MG/DL (ref 8.7–10.5)
CEA SERPL-MCNC: 2.8 NG/ML (ref 0–5)
CHLORIDE SERPL-SCNC: 106 MMOL/L (ref 95–110)
CO2 SERPL-SCNC: 24 MMOL/L (ref 23–29)
CREAT SERPL-MCNC: 0.8 MG/DL (ref 0.5–1.4)
DIFFERENTIAL METHOD BLD: ABNORMAL
EOSINOPHIL # BLD AUTO: 0 K/UL (ref 0–0.5)
EOSINOPHIL NFR BLD: 0.9 % (ref 0–8)
ERYTHROCYTE [DISTWIDTH] IN BLOOD BY AUTOMATED COUNT: 14.6 % (ref 11.5–14.5)
EST. GFR  (NO RACE VARIABLE): >60 ML/MIN/1.73 M^2
GLUCOSE SERPL-MCNC: 83 MG/DL (ref 70–110)
HCT VFR BLD AUTO: 33.7 % (ref 37–48.5)
HGB BLD-MCNC: 10.6 G/DL (ref 12–16)
IMM GRANULOCYTES # BLD AUTO: 0.01 K/UL (ref 0–0.04)
IMM GRANULOCYTES NFR BLD AUTO: 0.3 % (ref 0–0.5)
LYMPHOCYTES # BLD AUTO: 1.1 K/UL (ref 1–4.8)
LYMPHOCYTES NFR BLD: 31.2 % (ref 18–48)
MCH RBC QN AUTO: 28.2 PG (ref 27–31)
MCHC RBC AUTO-ENTMCNC: 31.5 G/DL (ref 32–36)
MCV RBC AUTO: 90 FL (ref 82–98)
MONOCYTES # BLD AUTO: 0.4 K/UL (ref 0.3–1)
MONOCYTES NFR BLD: 12.6 % (ref 4–15)
NEUTROPHILS # BLD AUTO: 1.9 K/UL (ref 1.8–7.7)
NEUTROPHILS NFR BLD: 54.4 % (ref 38–73)
NRBC BLD-RTO: 0 /100 WBC
PLATELET # BLD AUTO: 252 K/UL (ref 150–450)
PMV BLD AUTO: 10.4 FL (ref 9.2–12.9)
POTASSIUM SERPL-SCNC: 3.6 MMOL/L (ref 3.5–5.1)
PROT SERPL-MCNC: 6.8 G/DL (ref 6–8.4)
RBC # BLD AUTO: 3.76 M/UL (ref 4–5.4)
SODIUM SERPL-SCNC: 140 MMOL/L (ref 136–145)
WBC # BLD AUTO: 3.4 K/UL (ref 3.9–12.7)

## 2024-01-10 PROCEDURE — 1126F AMNT PAIN NOTED NONE PRSNT: CPT | Mod: CPTII,S$GLB,, | Performed by: SPECIALIST

## 2024-01-10 PROCEDURE — 99214 OFFICE O/P EST MOD 30 MIN: CPT | Mod: S$GLB,,, | Performed by: SPECIALIST

## 2024-01-10 PROCEDURE — 1159F MED LIST DOCD IN RCRD: CPT | Mod: CPTII,S$GLB,, | Performed by: SPECIALIST

## 2024-01-10 PROCEDURE — 82378 CARCINOEMBRYONIC ANTIGEN: CPT | Performed by: SPECIALIST

## 2024-01-10 PROCEDURE — 86300 IMMUNOASSAY TUMOR CA 15-3: CPT | Performed by: SPECIALIST

## 2024-01-10 PROCEDURE — 80053 COMPREHEN METABOLIC PANEL: CPT | Performed by: SPECIALIST

## 2024-01-10 PROCEDURE — 85025 COMPLETE CBC W/AUTO DIFF WBC: CPT | Performed by: SPECIALIST

## 2024-01-10 PROCEDURE — 99999 PR PBB SHADOW E&M-EST. PATIENT-LVL III: CPT | Mod: PBBFAC,,, | Performed by: SPECIALIST

## 2024-01-10 PROCEDURE — 83615 LACTATE (LD) (LDH) ENZYME: CPT | Performed by: SPECIALIST

## 2024-01-11 LAB — LDH SERPL L TO P-CCNC: 198 U/L (ref 110–260)

## 2024-01-15 LAB — CANCER AG27-29 SERPL-ACNC: 14.3 U/ML

## 2024-04-09 PROBLEM — Z17.0 ESTROGEN RECEPTOR POSITIVE: Status: ACTIVE | Noted: 2024-04-09

## 2024-04-09 NOTE — ASSESSMENT & PLAN NOTE
She is doing well and will continue to be followed according to NCCN Guidelines. She understands that her exams have remained stable and is comfortable being followed in a conservative fashion. She understands the importance of monthly self-breast examination and knows to report any and all changes as they occur.      Labs obtained today: CBC, Chem 12, CEA, LDH, CA 27.29 - after resulted, these will be compared to her previous values and all abnormal values will be phoned to her for discussion.

## 2024-04-09 NOTE — PROGRESS NOTES
[facility]  Jimy Matthews MD, FACS  Hayden Trevizo NP-C      Date of Service: 4/10/2024    Chief Complaint:   Nathaly Chaudhary is a 78 y.o. female presenting today for her 3 month follow up to her breast cancer diagnosis.  She is due for a Physical Examination.  She reports no interval changes.     History of Present Illness:   Nathaly Chaudhary was diagnosed with left breast cancer in April 2023 at the age of 77. She elected lumpectomy that showed a 1.9 cm Grade II IDC with negative margins and a negative SLN.  T1cN0. She completed adjuvant Radiation and started Tamoxifen in September 2023.  ER/MO Positive and HER 2 FISH negative.  MD:::Mery Perez NP; Beka Melton MD; Kendall Carranza MD; Plastics       Past Medical History:   Diagnosis Date    Estrogen receptor positive 04/09/2024    Malignant neoplasm of lower-outer quadrant of left breast of female, estrogen receptor positive 04/10/2023    Mass of lower outer quadrant of left breast 03/31/2023    Seroma of breast 05/30/2023      Past Surgical History:   Procedure Laterality Date    BREAST LUMPECTOMY W/ NEEDLE LOCALIZATION Left 05/04/2023    with SLN Biopsy and OncoPlastic (reduction/lift)        Current Outpatient Medications:     albuterol (PROVENTIL/VENTOLIN HFA) 90 mcg/actuation inhaler, ProAir HFA Take No date recorded No form recorded No frequency recorded No route recorded No set duration recorded No set duration amount recorded active No dosage strength recorded No dosage strength units of measure recorded, Disp: , Rfl:     aspirin 81 MG Chew, Take 81 mg by mouth., Disp: , Rfl:     budesonide (PULMICORT) 0.25 mg/2 mL nebulizer solution, Inhale into the lungs., Disp: , Rfl:     celecoxib (CELEBREX) 100 MG capsule, celecoxib Take No date recorded No form recorded No frequency recorded No route recorded No set duration recorded No set duration amount recorded active No dosage strength recorded No dosage strength units of  measure recorded, Disp: , Rfl:     cetirizine 0.24 % Dpet, cetirizine Take No date recorded No form recorded No frequency recorded No route recorded No set duration recorded No set duration amount recorded active No dosage strength recorded No dosage strength units of measure recorded, Disp: , Rfl:     cholecalciferol, vitamin D3, 10 mcg (400 unit) Cap capsule, Vitamin D3 Take No date recorded No form recorded No frequency recorded No route recorded No set duration recorded No set duration amount recorded active No dosage strength recorded No dosage strength units of measure recorded, Disp: , Rfl:     diclofenac (VOLTAREN) 75 MG EC tablet, diclofenac sodium Take 2 times per day No date recorded tablet,delayed release (DR/EC) 2 times per day No route recorded No set duration recorded No set duration amount recorded active 75 mg, Disp: , Rfl:     esomeprazole (NEXIUM) 40 MG capsule, esomeprazole magnesium Take 1 time per day No date recorded capsule,delayed release(DR/EC) 1 time per day No route recorded No set duration recorded No set duration amount recorded active 40 mg, Disp: , Rfl:     fexofenadine (ALLEGRA) 180 MG tablet, fexofenadine Take No date recorded No form recorded No frequency recorded No route recorded No set duration recorded No set duration amount recorded suspended No dosage strength recorded No dosage strength units of measure recorded, Disp: , Rfl:     fluticasone-umeclidin-vilanter (TRELEGY ELLIPTA) 100-62.5-25 mcg DsDv, Inhale 1 puff into the lungs once daily., Disp: , Rfl:     gabapentin (NEURONTIN) 100 MG capsule, Take by mouth., Disp: , Rfl:     levalbuterol (XOPENEX) 0.31 mg/3 mL nebulizer solution, Inhale into the lungs., Disp: , Rfl:     LINZESS 290 mcg Cap capsule, Take by mouth., Disp: , Rfl:     lisinopriL (PRINIVIL,ZESTRIL) 20 MG tablet, lisinopril Take 1 time per day No date recorded tablet 1 time per day No route recorded No set duration recorded No set duration amount recorded  active 20 mg, Disp: , Rfl:     losartan-hydrochlorothiazide 100-12.5 mg (HYZAAR) 100-12.5 mg Tab, Take by mouth., Disp: , Rfl:     metFORMIN (GLUCOPHAGE) 500 MG tablet, metformin Take 2 times per day No date recorded tablet 2 times per day No route recorded No set duration recorded No set duration amount recorded active 500 mg, Disp: , Rfl:     metoprolol succinate (TOPROL-XL) 25 MG 24 hr tablet, metoprolol succinate Take 1 time per day No date recorded tablet extended release 24 hr 1 time per day No route recorded No set duration recorded No set duration amount recorded active 25 mg, Disp: , Rfl:     montelukast (SINGULAIR) 10 mg tablet, Take by mouth., Disp: , Rfl:     predniSONE (DELTASONE) 10 MG tablet, Take by mouth., Disp: , Rfl:     rosuvastatin (CRESTOR) 20 MG tablet, Take by mouth., Disp: , Rfl:     sulfamethoxazole-trimethoprim 800-160mg (BACTRIM DS) 800-160 mg Tab, Take 1 tablet by mouth 2 (two) times daily., Disp: 20 tablet, Rfl: 0    tamoxifen (NOLVADEX) 20 MG Tab, Take 1 tablet by mouth every morning., Disp: , Rfl:     tiotropium (SPIRIVA) 18 mcg inhalation capsule, Spiriva with HandiHaler Take No date recorded No form recorded No frequency recorded No route recorded No set duration recorded No set duration amount recorded suspended No dosage strength recorded No dosage strength units of measure recorded, Disp: , Rfl:     triamcinolone (KENALOG) 0.5 % ointment, Apply topically., Disp: , Rfl:    Review of patient's allergies indicates:   Allergen Reactions    Adhesive tape-silicones     Tetanus vaccines and toxoid       Social History     Tobacco Use    Smoking status: Never    Smokeless tobacco: Never   Substance Use Topics    Alcohol use: Never      Family History   Problem Relation Age of Onset    Breast cancer Neg Hx     Ovarian cancer Neg Hx         Review of Systems   Integumentary:  Negative for color change, rash, mole/lesion, breast mass, breast discharge and breast tenderness.   Breast:  Negative for mass and tenderness       Physical Exam   Constitutional: She is cooperative.   HENT:   Head: Normocephalic.   Pulmonary/Chest: She exhibits no mass. Right breast exhibits no inverted nipple, no mass, no nipple discharge, no skin change and no tenderness. Left breast exhibits no inverted nipple, no mass, no nipple discharge, no skin change and no tenderness.   Abdominal: Normal appearance.   Musculoskeletal: Lymphadenopathy:      Upper Body:      Right upper body: No supraclavicular or axillary adenopathy.      Left upper body: No supraclavicular or axillary adenopathy.     Neurological: She is alert.   Skin: No rash noted.          ASSESSMENT and PLAN OF CARE     1. Malignant neoplasm of lower-outer quadrant of left breast of female, estrogen receptor positive  Assessment & Plan:  She is doing well and will continue to be followed according to NCCN Guidelines. She understands that her exams have remained stable and is comfortable being followed in a conservative fashion. She understands the importance of monthly self-breast examination and knows to report any and all changes as they occur.      Labs obtained today: CBC, Chem 12, CEA, LDH, CA 27.29 - after resulted, these will be compared to her previous values and all abnormal values will be phoned to her for discussion.      Orders:  -     Cancer Antigen 27-29  -     Comprehensive Metabolic Panel  -     Lactate Dehydrogenase  -     CEA  -     CBC Auto Differential    2. Estrogen receptor positive  Assessment & Plan:  Continue Tamoxifen.      Medical Decision Making: It is my impression that this patient suffers all conditions contained in this medical document.  Each of these conditions did affect our plan of care and my medical decision making today.  It is my opinion that the medical decision making concerning this patient was of moderate difficulty based on the aforementioned conditions.  Any further recommendations will be communicated to the  patient by me.  I have reviewed and verified her allergies, list of medications, medical and surgical histories, social history, and a pertinent review of symptoms.    Follow up:  3 Months and PRN     For:PER (HERMELINDO) at  # 2 of 3

## 2024-04-10 ENCOUNTER — OFFICE VISIT (OUTPATIENT)
Dept: SURGERY | Facility: CLINIC | Age: 79
End: 2024-04-10
Payer: MEDICARE

## 2024-04-10 DIAGNOSIS — C50.512 MALIGNANT NEOPLASM OF LOWER-OUTER QUADRANT OF LEFT BREAST OF FEMALE, ESTROGEN RECEPTOR POSITIVE: Primary | ICD-10-CM

## 2024-04-10 DIAGNOSIS — Z17.0 MALIGNANT NEOPLASM OF LOWER-OUTER QUADRANT OF LEFT BREAST OF FEMALE, ESTROGEN RECEPTOR POSITIVE: Primary | ICD-10-CM

## 2024-04-10 DIAGNOSIS — Z17.0 ESTROGEN RECEPTOR POSITIVE: ICD-10-CM

## 2024-04-10 LAB
ALBUMIN SERPL BCP-MCNC: 3.6 G/DL (ref 3.5–5.2)
ALP SERPL-CCNC: 57 U/L (ref 55–135)
ALT SERPL W/O P-5'-P-CCNC: 10 U/L (ref 10–44)
ANION GAP SERPL CALC-SCNC: 8 MMOL/L (ref 8–16)
AST SERPL-CCNC: 16 U/L (ref 10–40)
BASOPHILS # BLD AUTO: 0.03 K/UL (ref 0–0.2)
BASOPHILS NFR BLD: 0.7 % (ref 0–1.9)
BILIRUB SERPL-MCNC: 0.4 MG/DL (ref 0.1–1)
BUN SERPL-MCNC: 9 MG/DL (ref 8–23)
CALCIUM SERPL-MCNC: 9.7 MG/DL (ref 8.7–10.5)
CEA SERPL-MCNC: 2.8 NG/ML (ref 0–5)
CHLORIDE SERPL-SCNC: 107 MMOL/L (ref 95–110)
CO2 SERPL-SCNC: 25 MMOL/L (ref 23–29)
CREAT SERPL-MCNC: 0.8 MG/DL (ref 0.5–1.4)
DIFFERENTIAL METHOD BLD: ABNORMAL
EOSINOPHIL # BLD AUTO: 0 K/UL (ref 0–0.5)
EOSINOPHIL NFR BLD: 1 % (ref 0–8)
ERYTHROCYTE [DISTWIDTH] IN BLOOD BY AUTOMATED COUNT: 14.6 % (ref 11.5–14.5)
EST. GFR  (NO RACE VARIABLE): >60 ML/MIN/1.73 M^2
GLUCOSE SERPL-MCNC: 89 MG/DL (ref 70–110)
HCT VFR BLD AUTO: 36.1 % (ref 37–48.5)
HGB BLD-MCNC: 11.4 G/DL (ref 12–16)
IMM GRANULOCYTES # BLD AUTO: 0.03 K/UL (ref 0–0.04)
IMM GRANULOCYTES NFR BLD AUTO: 0.7 % (ref 0–0.5)
LDH SERPL L TO P-CCNC: 222 U/L (ref 110–260)
LYMPHOCYTES # BLD AUTO: 1.1 K/UL (ref 1–4.8)
LYMPHOCYTES NFR BLD: 27.1 % (ref 18–48)
MCH RBC QN AUTO: 28.6 PG (ref 27–31)
MCHC RBC AUTO-ENTMCNC: 31.6 G/DL (ref 32–36)
MCV RBC AUTO: 91 FL (ref 82–98)
MONOCYTES # BLD AUTO: 0.6 K/UL (ref 0.3–1)
MONOCYTES NFR BLD: 14.4 % (ref 4–15)
NEUTROPHILS # BLD AUTO: 2.3 K/UL (ref 1.8–7.7)
NEUTROPHILS NFR BLD: 56.1 % (ref 38–73)
NRBC BLD-RTO: 0 /100 WBC
PLATELET # BLD AUTO: 269 K/UL (ref 150–450)
PMV BLD AUTO: 10 FL (ref 9.2–12.9)
POTASSIUM SERPL-SCNC: 3.7 MMOL/L (ref 3.5–5.1)
PROT SERPL-MCNC: 6.8 G/DL (ref 6–8.4)
RBC # BLD AUTO: 3.98 M/UL (ref 4–5.4)
SODIUM SERPL-SCNC: 140 MMOL/L (ref 136–145)
WBC # BLD AUTO: 4.09 K/UL (ref 3.9–12.7)

## 2024-04-10 PROCEDURE — 1159F MED LIST DOCD IN RCRD: CPT | Mod: CPTII,S$GLB,, | Performed by: NURSE PRACTITIONER

## 2024-04-10 PROCEDURE — 99999 PR PBB SHADOW E&M-EST. PATIENT-LVL III: CPT | Mod: PBBFAC,,, | Performed by: NURSE PRACTITIONER

## 2024-04-10 PROCEDURE — 99214 OFFICE O/P EST MOD 30 MIN: CPT | Mod: S$GLB,,, | Performed by: NURSE PRACTITIONER

## 2024-04-10 PROCEDURE — 80053 COMPREHEN METABOLIC PANEL: CPT | Performed by: NURSE PRACTITIONER

## 2024-04-10 PROCEDURE — 83615 LACTATE (LD) (LDH) ENZYME: CPT | Performed by: NURSE PRACTITIONER

## 2024-04-10 PROCEDURE — 86300 IMMUNOASSAY TUMOR CA 15-3: CPT | Performed by: NURSE PRACTITIONER

## 2024-04-10 PROCEDURE — 1160F RVW MEDS BY RX/DR IN RCRD: CPT | Mod: CPTII,S$GLB,, | Performed by: NURSE PRACTITIONER

## 2024-04-10 PROCEDURE — 82378 CARCINOEMBRYONIC ANTIGEN: CPT | Performed by: NURSE PRACTITIONER

## 2024-04-10 PROCEDURE — 85025 COMPLETE CBC W/AUTO DIFF WBC: CPT | Performed by: NURSE PRACTITIONER

## 2024-04-12 LAB — CANCER AG27-29 SERPL-ACNC: 12.1 U/ML

## 2024-07-01 DIAGNOSIS — Z17.0 MALIGNANT NEOPLASM OF LOWER-OUTER QUADRANT OF LEFT BREAST OF FEMALE, ESTROGEN RECEPTOR POSITIVE: Primary | ICD-10-CM

## 2024-07-01 DIAGNOSIS — C50.512 MALIGNANT NEOPLASM OF LOWER-OUTER QUADRANT OF LEFT BREAST OF FEMALE, ESTROGEN RECEPTOR POSITIVE: Primary | ICD-10-CM

## 2024-07-09 NOTE — ASSESSMENT & PLAN NOTE
She is doing well and will continue to be followed according to NCCN Guidelines. She understands that her imaging and  exams have remained stable and is comfortable being followed in a conservative fashion. She understands the importance of monthly self-breast examination and knows to report any and all changes as they occur.      Labs obtained today: CBC, Chem 12, CEA, LDH, CA 27.29 - after resulted, these will be compared to her previous values and all abnormal values will be phoned to her for discussion.

## 2024-07-09 NOTE — PROGRESS NOTES
[facility]  Jimy Matthews MD, FACS  Hayden Trevizo NP-KIMBERLEE      Date of Service: 7/10/2024    Chief Complaint:   Nathaly Chaudhary is a 79 y.o. female presenting today for her 3-month follow up due to her breast cancer diagnosis.  She is due for her Mammogram  #2 of 3 .  She reports no interval changes on her self-breast examination.     History of Present Illness:   Nathaly Chaudhary was diagnosed with left breast cancer in April 2023 at the age of 77. She elected lumpectomy that showed a 1.9 cm Grade II IDC with negative margins and a negative SLN.  T1cN0. She completed adjuvant Radiation and started Tamoxifen in September 2023.  ER/CT Positive and HER 2 FISH negative.  MD:::Mery Perez NP; Beka Melton MD; Kendall Carranza MD; Plastics     Past Medical History:   Diagnosis Date    Estrogen receptor positive 04/09/2024    Malignant neoplasm of lower-outer quadrant of left breast of female, estrogen receptor positive 04/10/2023    Mass of lower outer quadrant of left breast 03/31/2023    Seroma of breast 05/30/2023      Past Surgical History:   Procedure Laterality Date    BREAST LUMPECTOMY W/ NEEDLE LOCALIZATION Left 05/04/2023    with SLN Biopsy and OncoPlastic (reduction/lift)        Current Outpatient Medications:     albuterol (PROVENTIL/VENTOLIN HFA) 90 mcg/actuation inhaler, ProAir HFA Take No date recorded No form recorded No frequency recorded No route recorded No set duration recorded No set duration amount recorded active No dosage strength recorded No dosage strength units of measure recorded, Disp: , Rfl:     aspirin 81 MG Chew, Take 81 mg by mouth., Disp: , Rfl:     budesonide (PULMICORT) 0.25 mg/2 mL nebulizer solution, Inhale into the lungs., Disp: , Rfl:     celecoxib (CELEBREX) 100 MG capsule, celecoxib Take No date recorded No form recorded No frequency recorded No route recorded No set duration recorded No set duration amount recorded active No dosage strength  recorded No dosage strength units of measure recorded, Disp: , Rfl:     cetirizine 0.24 % Dpet, cetirizine Take No date recorded No form recorded No frequency recorded No route recorded No set duration recorded No set duration amount recorded active No dosage strength recorded No dosage strength units of measure recorded, Disp: , Rfl:     cholecalciferol, vitamin D3, 10 mcg (400 unit) Cap capsule, Vitamin D3 Take No date recorded No form recorded No frequency recorded No route recorded No set duration recorded No set duration amount recorded active No dosage strength recorded No dosage strength units of measure recorded, Disp: , Rfl:     diclofenac (VOLTAREN) 75 MG EC tablet, diclofenac sodium Take 2 times per day No date recorded tablet,delayed release (DR/EC) 2 times per day No route recorded No set duration recorded No set duration amount recorded active 75 mg, Disp: , Rfl:     esomeprazole (NEXIUM) 40 MG capsule, esomeprazole magnesium Take 1 time per day No date recorded capsule,delayed release(DR/EC) 1 time per day No route recorded No set duration recorded No set duration amount recorded active 40 mg, Disp: , Rfl:     fexofenadine (ALLEGRA) 180 MG tablet, fexofenadine Take No date recorded No form recorded No frequency recorded No route recorded No set duration recorded No set duration amount recorded suspended No dosage strength recorded No dosage strength units of measure recorded, Disp: , Rfl:     fluticasone-umeclidin-vilanter (TRELEGY ELLIPTA) 100-62.5-25 mcg DsDv, Inhale 1 puff into the lungs once daily., Disp: , Rfl:     gabapentin (NEURONTIN) 100 MG capsule, Take by mouth., Disp: , Rfl:     levalbuterol (XOPENEX) 0.31 mg/3 mL nebulizer solution, Inhale into the lungs., Disp: , Rfl:     LINZESS 290 mcg Cap capsule, Take by mouth., Disp: , Rfl:     lisinopriL (PRINIVIL,ZESTRIL) 20 MG tablet, lisinopril Take 1 time per day No date recorded tablet 1 time per day No route recorded No set duration recorded  No set duration amount recorded active 20 mg, Disp: , Rfl:     losartan-hydrochlorothiazide 100-12.5 mg (HYZAAR) 100-12.5 mg Tab, Take by mouth., Disp: , Rfl:     metFORMIN (GLUCOPHAGE) 500 MG tablet, metformin Take 2 times per day No date recorded tablet 2 times per day No route recorded No set duration recorded No set duration amount recorded active 500 mg, Disp: , Rfl:     metoprolol succinate (TOPROL-XL) 25 MG 24 hr tablet, metoprolol succinate Take 1 time per day No date recorded tablet extended release 24 hr 1 time per day No route recorded No set duration recorded No set duration amount recorded active 25 mg, Disp: , Rfl:     montelukast (SINGULAIR) 10 mg tablet, Take by mouth., Disp: , Rfl:     predniSONE (DELTASONE) 10 MG tablet, Take by mouth., Disp: , Rfl:     rosuvastatin (CRESTOR) 20 MG tablet, Take by mouth., Disp: , Rfl:     sulfamethoxazole-trimethoprim 800-160mg (BACTRIM DS) 800-160 mg Tab, Take 1 tablet by mouth 2 (two) times daily., Disp: 20 tablet, Rfl: 0    tamoxifen (NOLVADEX) 20 MG Tab, Take 1 tablet by mouth every morning., Disp: , Rfl:     tiotropium (SPIRIVA) 18 mcg inhalation capsule, Spiriva with HandiHaler Take No date recorded No form recorded No frequency recorded No route recorded No set duration recorded No set duration amount recorded suspended No dosage strength recorded No dosage strength units of measure recorded, Disp: , Rfl:     triamcinolone (KENALOG) 0.5 % ointment, Apply topically., Disp: , Rfl:    Review of patient's allergies indicates:   Allergen Reactions    Adhesive tape-silicones     Tetanus vaccines and toxoid       Social History     Tobacco Use    Smoking status: Never    Smokeless tobacco: Never   Substance Use Topics    Alcohol use: Never      Family History   Problem Relation Name Age of Onset    Breast cancer Neg Hx      Ovarian cancer Neg Hx          Review of Systems   Integumentary:  Negative for color change, rash, mole/lesion, breast mass, breast discharge  and breast tenderness.   Breast: Negative for mass and tenderness       Physical Exam   Constitutional: She is cooperative.   HENT:   Head: Normocephalic.   Pulmonary/Chest: She exhibits no mass. Right breast exhibits no inverted nipple, no mass, no nipple discharge, no skin change and no tenderness. Left breast exhibits no inverted nipple, no mass, no nipple discharge, no skin change and no tenderness.   Abdominal: Normal appearance.   Musculoskeletal: Lymphadenopathy:      Upper Body:      Right upper body: No supraclavicular or axillary adenopathy.      Left upper body: No supraclavicular or axillary adenopathy.     Neurological: She is alert.   Skin: No rash noted.         MAMMOGRAM REPORT: The patient has had a left-sided lumpectomy and bilateral reduction mammoplasty. There are scattered areas of fibroglandular density. No suspicious mass, calcification, or architectural distortion of either breast to suggest malignancy. Benign calcifications are noted. Benign evolving fat necrosis is noted in both breast, some areas with associated evolving calcifications. IMPRESSION: No mammographic evidence of malignancy. No significant change when compared to prior exam. Annual mammogram in 1 year is recommended.  ASSESSMENT and PLAN OF CARE     1. Malignant neoplasm of lower-outer quadrant of left breast of female, estrogen receptor positive  Assessment & Plan:  She is doing well and will continue to be followed according to NCCN Guidelines. She understands that her imaging and  exams have remained stable and is comfortable being followed in a conservative fashion. She understands the importance of monthly self-breast examination and knows to report any and all changes as they occur.      Labs obtained today: CBC, Chem 12, CEA, LDH, CA 27.29 - after resulted, these will be compared to her previous values and all abnormal values will be phoned to her for discussion.      Orders:  -     Cancer Antigen 27-29  -      Comprehensive Metabolic Panel  -     Lactate Dehydrogenase  -     CEA  -     CBC Auto Differential    2. Estrogen receptor positive  Assessment & Plan:  Continue Tamoxifen.       Medical Decision Making: It is my impression that this patient suffers all conditions contained in this medical document.  Each of these conditions did affect our plan of care and my medical decision making today.  It is my opinion that the medical decision making concerning this patient was of moderate difficulty based on the aforementioned conditions.  Any further recommendations will be communicated to the patient by me.  I have reviewed and verified her allergies, list of medications, medical and surgical histories, social history, and a pertinent review of symptoms.     Follow up:  3 months and prn  for Physical Exam

## 2024-07-10 ENCOUNTER — OFFICE VISIT (OUTPATIENT)
Dept: SURGERY | Facility: CLINIC | Age: 79
End: 2024-07-10
Payer: MEDICARE

## 2024-07-10 DIAGNOSIS — Z17.0 MALIGNANT NEOPLASM OF LOWER-OUTER QUADRANT OF LEFT BREAST OF FEMALE, ESTROGEN RECEPTOR POSITIVE: Primary | ICD-10-CM

## 2024-07-10 DIAGNOSIS — C50.512 MALIGNANT NEOPLASM OF LOWER-OUTER QUADRANT OF LEFT BREAST OF FEMALE, ESTROGEN RECEPTOR POSITIVE: Primary | ICD-10-CM

## 2024-07-10 DIAGNOSIS — Z17.0 ESTROGEN RECEPTOR POSITIVE: ICD-10-CM

## 2024-07-10 LAB
ALBUMIN SERPL BCP-MCNC: 3.7 G/DL (ref 3.5–5.2)
ALP SERPL-CCNC: 49 U/L (ref 55–135)
ALT SERPL W/O P-5'-P-CCNC: 13 U/L (ref 10–44)
ANION GAP SERPL CALC-SCNC: 8 MMOL/L (ref 8–16)
AST SERPL-CCNC: 20 U/L (ref 10–40)
BASOPHILS # BLD AUTO: 0.02 K/UL (ref 0–0.2)
BASOPHILS NFR BLD: 0.6 % (ref 0–1.9)
BILIRUB SERPL-MCNC: 0.8 MG/DL (ref 0.1–1)
BUN SERPL-MCNC: 12 MG/DL (ref 8–23)
CALCIUM SERPL-MCNC: 9.2 MG/DL (ref 8.7–10.5)
CEA SERPL-MCNC: 2.4 NG/ML (ref 0–5)
CHLORIDE SERPL-SCNC: 107 MMOL/L (ref 95–110)
CO2 SERPL-SCNC: 28 MMOL/L (ref 23–29)
CREAT SERPL-MCNC: 0.7 MG/DL (ref 0.5–1.4)
DIFFERENTIAL METHOD BLD: ABNORMAL
EOSINOPHIL # BLD AUTO: 0 K/UL (ref 0–0.5)
EOSINOPHIL NFR BLD: 1.3 % (ref 0–8)
ERYTHROCYTE [DISTWIDTH] IN BLOOD BY AUTOMATED COUNT: 15.3 % (ref 11.5–14.5)
EST. GFR  (NO RACE VARIABLE): >60 ML/MIN/1.73 M^2
GLUCOSE SERPL-MCNC: 83 MG/DL (ref 70–110)
HCT VFR BLD AUTO: 31.8 % (ref 37–48.5)
HGB BLD-MCNC: 10.1 G/DL (ref 12–16)
IMM GRANULOCYTES # BLD AUTO: 0.01 K/UL (ref 0–0.04)
IMM GRANULOCYTES NFR BLD AUTO: 0.3 % (ref 0–0.5)
LDH SERPL L TO P-CCNC: 207 U/L (ref 110–260)
LYMPHOCYTES # BLD AUTO: 1.1 K/UL (ref 1–4.8)
LYMPHOCYTES NFR BLD: 34.7 % (ref 18–48)
MCH RBC QN AUTO: 29 PG (ref 27–31)
MCHC RBC AUTO-ENTMCNC: 31.8 G/DL (ref 32–36)
MCV RBC AUTO: 91 FL (ref 82–98)
MONOCYTES # BLD AUTO: 0.4 K/UL (ref 0.3–1)
MONOCYTES NFR BLD: 13.4 % (ref 4–15)
NEUTROPHILS # BLD AUTO: 1.6 K/UL (ref 1.8–7.7)
NEUTROPHILS NFR BLD: 49.7 % (ref 38–73)
NRBC BLD-RTO: 0 /100 WBC
PLATELET # BLD AUTO: 230 K/UL (ref 150–450)
PMV BLD AUTO: 10.8 FL (ref 9.2–12.9)
POTASSIUM SERPL-SCNC: 3.4 MMOL/L (ref 3.5–5.1)
PROT SERPL-MCNC: 6.4 G/DL (ref 6–8.4)
RBC # BLD AUTO: 3.48 M/UL (ref 4–5.4)
SODIUM SERPL-SCNC: 143 MMOL/L (ref 136–145)
WBC # BLD AUTO: 3.2 K/UL (ref 3.9–12.7)

## 2024-07-10 PROCEDURE — 1126F AMNT PAIN NOTED NONE PRSNT: CPT | Mod: CPTII,S$GLB,, | Performed by: NURSE PRACTITIONER

## 2024-07-10 PROCEDURE — 86300 IMMUNOASSAY TUMOR CA 15-3: CPT | Performed by: NURSE PRACTITIONER

## 2024-07-10 PROCEDURE — 83615 LACTATE (LD) (LDH) ENZYME: CPT | Performed by: NURSE PRACTITIONER

## 2024-07-10 PROCEDURE — 82378 CARCINOEMBRYONIC ANTIGEN: CPT | Performed by: NURSE PRACTITIONER

## 2024-07-10 PROCEDURE — 80053 COMPREHEN METABOLIC PANEL: CPT | Performed by: NURSE PRACTITIONER

## 2024-07-10 PROCEDURE — 99214 OFFICE O/P EST MOD 30 MIN: CPT | Mod: S$GLB,,, | Performed by: NURSE PRACTITIONER

## 2024-07-10 PROCEDURE — 1160F RVW MEDS BY RX/DR IN RCRD: CPT | Mod: CPTII,S$GLB,, | Performed by: NURSE PRACTITIONER

## 2024-07-10 PROCEDURE — 85025 COMPLETE CBC W/AUTO DIFF WBC: CPT | Performed by: NURSE PRACTITIONER

## 2024-07-10 PROCEDURE — 1159F MED LIST DOCD IN RCRD: CPT | Mod: CPTII,S$GLB,, | Performed by: NURSE PRACTITIONER

## 2024-07-10 PROCEDURE — 99999 PR PBB SHADOW E&M-EST. PATIENT-LVL III: CPT | Mod: PBBFAC,,, | Performed by: NURSE PRACTITIONER

## 2024-07-12 LAB — CANCER AG27-29 SERPL-ACNC: 12.5 U/ML

## 2024-10-14 ENCOUNTER — OFFICE VISIT (OUTPATIENT)
Dept: SURGERY | Facility: CLINIC | Age: 79
End: 2024-10-14
Payer: MEDICARE

## 2024-10-14 ENCOUNTER — LAB VISIT (OUTPATIENT)
Dept: LAB | Facility: HOSPITAL | Age: 79
End: 2024-10-14
Attending: SPECIALIST
Payer: MEDICARE

## 2024-10-14 DIAGNOSIS — Z17.0 MALIGNANT NEOPLASM OF LOWER-OUTER QUADRANT OF LEFT BREAST OF FEMALE, ESTROGEN RECEPTOR POSITIVE: Primary | ICD-10-CM

## 2024-10-14 DIAGNOSIS — C50.512 MALIGNANT NEOPLASM OF LOWER-OUTER QUADRANT OF LEFT BREAST OF FEMALE, ESTROGEN RECEPTOR POSITIVE: ICD-10-CM

## 2024-10-14 DIAGNOSIS — Z17.0 MALIGNANT NEOPLASM OF LOWER-OUTER QUADRANT OF LEFT BREAST OF FEMALE, ESTROGEN RECEPTOR POSITIVE: ICD-10-CM

## 2024-10-14 DIAGNOSIS — Z17.0 ESTROGEN RECEPTOR POSITIVE: ICD-10-CM

## 2024-10-14 DIAGNOSIS — C50.512 MALIGNANT NEOPLASM OF LOWER-OUTER QUADRANT OF LEFT BREAST OF FEMALE, ESTROGEN RECEPTOR POSITIVE: Primary | ICD-10-CM

## 2024-10-14 LAB
ALBUMIN SERPL BCP-MCNC: 3.6 G/DL (ref 3.5–5.2)
ALP SERPL-CCNC: 53 U/L (ref 55–135)
ALT SERPL W/O P-5'-P-CCNC: 11 U/L (ref 10–44)
ANION GAP SERPL CALC-SCNC: 10 MMOL/L (ref 8–16)
AST SERPL-CCNC: 18 U/L (ref 10–40)
BASOPHILS # BLD AUTO: 0.01 K/UL (ref 0–0.2)
BASOPHILS NFR BLD: 0.3 % (ref 0–1.9)
BILIRUB SERPL-MCNC: 0.4 MG/DL (ref 0.1–1)
BUN SERPL-MCNC: 10 MG/DL (ref 8–23)
CALCIUM SERPL-MCNC: 9.5 MG/DL (ref 8.7–10.5)
CEA SERPL-MCNC: 2.9 NG/ML (ref 0–5)
CHLORIDE SERPL-SCNC: 107 MMOL/L (ref 95–110)
CO2 SERPL-SCNC: 25 MMOL/L (ref 23–29)
CREAT SERPL-MCNC: 0.8 MG/DL (ref 0.5–1.4)
DIFFERENTIAL METHOD BLD: ABNORMAL
EOSINOPHIL # BLD AUTO: 0 K/UL (ref 0–0.5)
EOSINOPHIL NFR BLD: 1.3 % (ref 0–8)
ERYTHROCYTE [DISTWIDTH] IN BLOOD BY AUTOMATED COUNT: 14.5 % (ref 11.5–14.5)
EST. GFR  (NO RACE VARIABLE): >60 ML/MIN/1.73 M^2
GLUCOSE SERPL-MCNC: 83 MG/DL (ref 70–110)
HCT VFR BLD AUTO: 31.5 % (ref 37–48.5)
HGB BLD-MCNC: 9.9 G/DL (ref 12–16)
IMM GRANULOCYTES # BLD AUTO: 0.01 K/UL (ref 0–0.04)
IMM GRANULOCYTES NFR BLD AUTO: 0.3 % (ref 0–0.5)
LDH SERPL L TO P-CCNC: 201 U/L (ref 110–260)
LYMPHOCYTES # BLD AUTO: 0.8 K/UL (ref 1–4.8)
LYMPHOCYTES NFR BLD: 26.3 % (ref 18–48)
MCH RBC QN AUTO: 28.8 PG (ref 27–31)
MCHC RBC AUTO-ENTMCNC: 31.4 G/DL (ref 32–36)
MCV RBC AUTO: 92 FL (ref 82–98)
MONOCYTES # BLD AUTO: 0.5 K/UL (ref 0.3–1)
MONOCYTES NFR BLD: 15.7 % (ref 4–15)
NEUTROPHILS # BLD AUTO: 1.7 K/UL (ref 1.8–7.7)
NEUTROPHILS NFR BLD: 56.1 % (ref 38–73)
NRBC BLD-RTO: 0 /100 WBC
PLATELET # BLD AUTO: 205 K/UL (ref 150–450)
PMV BLD AUTO: 10.9 FL (ref 9.2–12.9)
POTASSIUM SERPL-SCNC: 3.7 MMOL/L (ref 3.5–5.1)
PROT SERPL-MCNC: 6.3 G/DL (ref 6–8.4)
RBC # BLD AUTO: 3.44 M/UL (ref 4–5.4)
SODIUM SERPL-SCNC: 142 MMOL/L (ref 136–145)
WBC # BLD AUTO: 3 K/UL (ref 3.9–12.7)

## 2024-10-14 PROCEDURE — 85025 COMPLETE CBC W/AUTO DIFF WBC: CPT | Performed by: NURSE PRACTITIONER

## 2024-10-14 PROCEDURE — 86300 IMMUNOASSAY TUMOR CA 15-3: CPT | Performed by: NURSE PRACTITIONER

## 2024-10-14 PROCEDURE — 80053 COMPREHEN METABOLIC PANEL: CPT | Performed by: NURSE PRACTITIONER

## 2024-10-14 PROCEDURE — 83615 LACTATE (LD) (LDH) ENZYME: CPT | Performed by: NURSE PRACTITIONER

## 2024-10-14 PROCEDURE — 82378 CARCINOEMBRYONIC ANTIGEN: CPT | Performed by: NURSE PRACTITIONER

## 2024-10-14 PROCEDURE — 99999 PR PBB SHADOW E&M-EST. PATIENT-LVL III: CPT | Mod: PBBFAC,,, | Performed by: NURSE PRACTITIONER

## 2024-10-14 PROCEDURE — 36415 COLL VENOUS BLD VENIPUNCTURE: CPT | Mod: PN | Performed by: NURSE PRACTITIONER

## 2024-10-14 RX ORDER — ALENDRONATE SODIUM 70 MG/1
70 TABLET ORAL
COMMUNITY
Start: 2024-10-09

## 2024-10-14 NOTE — PROGRESS NOTES
[facility]  Jimy Matthews MD, FACS  Hayden Trevizo NP-KIMBERLEE      Date of Service: 10/14/2024    Chief Complaint:   Nathaly Chaudhary is a 79 y.o. female presenting today for her 3 month follow up to her breast cancer diagnosis.  She is due for a Physical Examination.  She reports no interval changes.     History of Present Illness:   Nathaly Chaudhary was diagnosed with left breast cancer in April 2023 at the age of 77. She elected lumpectomy that showed a 1.9 cm Grade II IDC with negative margins and a negative SLN.  T1cN0. She completed adjuvant Radiation and started Tamoxifen in September 2023.  ER/DE Positive and HER 2 FISH negative.  MD:::Mery Perez NP; Beka Melton MD; Kendall Carranza MD; Kyle Yang MD    Past Medical History:   Diagnosis Date    Estrogen receptor positive 04/09/2024    Malignant neoplasm of lower-outer quadrant of left breast of female, estrogen receptor positive 04/10/2023    Mass of lower outer quadrant of left breast 03/31/2023    Seroma of breast 05/30/2023      Past Surgical History:   Procedure Laterality Date    BREAST LUMPECTOMY W/ NEEDLE LOCALIZATION Left 05/04/2023    with SLN Biopsy and OncoPlastic (reduction/lift)        Current Outpatient Medications:     alendronate (FOSAMAX) 70 MG tablet, Take 70 mg by mouth every 7 days., Disp: , Rfl:     albuterol (PROVENTIL/VENTOLIN HFA) 90 mcg/actuation inhaler, ProAir HFA Take No date recorded No form recorded No frequency recorded No route recorded No set duration recorded No set duration amount recorded active No dosage strength recorded No dosage strength units of measure recorded, Disp: , Rfl:     aspirin 81 MG Chew, Take 81 mg by mouth., Disp: , Rfl:     budesonide (PULMICORT) 0.25 mg/2 mL nebulizer solution, Inhale into the lungs., Disp: , Rfl:     celecoxib (CELEBREX) 100 MG capsule, celecoxib Take No date recorded No form recorded No frequency recorded No route recorded No set duration recorded  No set duration amount recorded active No dosage strength recorded No dosage strength units of measure recorded, Disp: , Rfl:     cetirizine 0.24 % Dpet, cetirizine Take No date recorded No form recorded No frequency recorded No route recorded No set duration recorded No set duration amount recorded active No dosage strength recorded No dosage strength units of measure recorded, Disp: , Rfl:     cholecalciferol, vitamin D3, 10 mcg (400 unit) Cap capsule, Vitamin D3 Take No date recorded No form recorded No frequency recorded No route recorded No set duration recorded No set duration amount recorded active No dosage strength recorded No dosage strength units of measure recorded, Disp: , Rfl:     diclofenac (VOLTAREN) 75 MG EC tablet, diclofenac sodium Take 2 times per day No date recorded tablet,delayed release (DR/EC) 2 times per day No route recorded No set duration recorded No set duration amount recorded active 75 mg, Disp: , Rfl:     esomeprazole (NEXIUM) 40 MG capsule, esomeprazole magnesium Take 1 time per day No date recorded capsule,delayed release(DR/EC) 1 time per day No route recorded No set duration recorded No set duration amount recorded active 40 mg, Disp: , Rfl:     fexofenadine (ALLEGRA) 180 MG tablet, fexofenadine Take No date recorded No form recorded No frequency recorded No route recorded No set duration recorded No set duration amount recorded suspended No dosage strength recorded No dosage strength units of measure recorded, Disp: , Rfl:     fluticasone-umeclidin-vilanter (TRELEGY ELLIPTA) 100-62.5-25 mcg DsDv, Inhale 1 puff into the lungs once daily., Disp: , Rfl:     gabapentin (NEURONTIN) 100 MG capsule, Take by mouth., Disp: , Rfl:     levalbuterol (XOPENEX) 0.31 mg/3 mL nebulizer solution, Inhale into the lungs., Disp: , Rfl:     LINZESS 290 mcg Cap capsule, Take by mouth., Disp: , Rfl:     lisinopriL (PRINIVIL,ZESTRIL) 20 MG tablet, lisinopril Take 1 time per day No date recorded tablet  1 time per day No route recorded No set duration recorded No set duration amount recorded active 20 mg, Disp: , Rfl:     losartan-hydrochlorothiazide 100-12.5 mg (HYZAAR) 100-12.5 mg Tab, Take by mouth., Disp: , Rfl:     metFORMIN (GLUCOPHAGE) 500 MG tablet, metformin Take 2 times per day No date recorded tablet 2 times per day No route recorded No set duration recorded No set duration amount recorded active 500 mg, Disp: , Rfl:     metoprolol succinate (TOPROL-XL) 25 MG 24 hr tablet, metoprolol succinate Take 1 time per day No date recorded tablet extended release 24 hr 1 time per day No route recorded No set duration recorded No set duration amount recorded active 25 mg, Disp: , Rfl:     montelukast (SINGULAIR) 10 mg tablet, Take by mouth., Disp: , Rfl:     predniSONE (DELTASONE) 10 MG tablet, Take by mouth., Disp: , Rfl:     rosuvastatin (CRESTOR) 20 MG tablet, Take by mouth., Disp: , Rfl:     sulfamethoxazole-trimethoprim 800-160mg (BACTRIM DS) 800-160 mg Tab, Take 1 tablet by mouth 2 (two) times daily., Disp: 20 tablet, Rfl: 0    tamoxifen (NOLVADEX) 20 MG Tab, Take 1 tablet by mouth every morning., Disp: , Rfl:     tiotropium (SPIRIVA) 18 mcg inhalation capsule, Spiriva with HandiHaler Take No date recorded No form recorded No frequency recorded No route recorded No set duration recorded No set duration amount recorded suspended No dosage strength recorded No dosage strength units of measure recorded, Disp: , Rfl:     triamcinolone (KENALOG) 0.5 % ointment, Apply topically., Disp: , Rfl:    Review of patient's allergies indicates:   Allergen Reactions    Adhesive tape-silicones     Tetanus vaccines and toxoid       Social History     Tobacco Use    Smoking status: Never    Smokeless tobacco: Never   Substance Use Topics    Alcohol use: Never      Family History   Problem Relation Name Age of Onset    Breast cancer Neg Hx      Ovarian cancer Neg Hx          Review of Systems   Integumentary:  Negative for color  change, rash, mole/lesion, breast mass, breast discharge and breast tenderness.   Breast: Negative for mass and tenderness       Physical Exam   Constitutional: She is cooperative.   HENT:   Head: Normocephalic.   Pulmonary/Chest: She exhibits no mass. Right breast exhibits no inverted nipple, no mass, no nipple discharge, no skin change and no tenderness. Left breast exhibits no inverted nipple, no mass, no nipple discharge, no skin change and no tenderness.   Abdominal: Normal appearance.   Musculoskeletal: Lymphadenopathy:      Upper Body:      Right upper body: No supraclavicular or axillary adenopathy.      Left upper body: No supraclavicular or axillary adenopathy.     Neurological: She is alert.   Skin: No rash noted.          ASSESSMENT and PLAN OF CARE     1. Malignant neoplasm of lower-outer quadrant of left breast of female, estrogen receptor positive  Assessment & Plan:  She is doing well and will continue to be followed according to NCCN Guidelines. She understands that her  exams have remained stable and is comfortable being followed in a conservative fashion. She understands the importance of monthly self-breast examination and knows to report any and all changes as they occur.      Labs obtained today: CBC, Chem 12, CEA, LDH, CA 27.29 - after resulted, these will be compared to her previous values and all abnormal values will be phoned to her for discussion.      Orders:  -     Cancer Antigen 27-29; Future; Expected date: 10/14/2024  -     Comprehensive Metabolic Panel; Future; Expected date: 10/14/2024  -     Lactate Dehydrogenase; Future; Expected date: 10/14/2024  -     CEA; Future; Expected date: 10/14/2024  -     CBC Auto Differential; Future; Expected date: 10/14/2024    2. Estrogen receptor positive  Assessment & Plan:  Continue Tamoxifen.       Medical Decision Making: It is my impression that this patient suffers all conditions contained in this medical document.  Each of these conditions  did affect our plan of care and my medical decision making today.  It is my opinion that the medical decision making concerning this patient was of moderate difficulty based on the aforementioned conditions.  Any further recommendations will be communicated to the patient by me.  I have reviewed and verified her allergies, list of medications, medical and surgical histories, social history, and a pertinent review of symptoms.    Follow up:  3 Months and PRN     For:Physical Examination, MGM (D) at , CXR, and LABS

## 2024-10-16 LAB — CANCER AG27-29 SERPL-ACNC: 13.4 U/ML

## 2024-12-13 ENCOUNTER — TELEPHONE (OUTPATIENT)
Dept: SURGERY | Facility: CLINIC | Age: 79
End: 2024-12-13
Payer: MEDICARE

## 2024-12-13 NOTE — TELEPHONE ENCOUNTER
Attempted to contact patient, no answer, left vm to call office regarding 1/15/25 appointment w/ Hayden Trevizo NP.(Location change)

## 2024-12-17 ENCOUNTER — TELEPHONE (OUTPATIENT)
Dept: SURGERY | Facility: CLINIC | Age: 79
End: 2024-12-17
Payer: MEDICARE

## 2024-12-17 NOTE — TELEPHONE ENCOUNTER
2nd attempt made to contact patient regarding 1/15/25 appt w/ Hayden Trevizo NP. (Location change)

## 2025-01-05 DIAGNOSIS — C50.512 MALIGNANT NEOPLASM OF LOWER-OUTER QUADRANT OF LEFT BREAST OF FEMALE, ESTROGEN RECEPTOR POSITIVE: Primary | ICD-10-CM

## 2025-01-05 DIAGNOSIS — Z17.0 MALIGNANT NEOPLASM OF LOWER-OUTER QUADRANT OF LEFT BREAST OF FEMALE, ESTROGEN RECEPTOR POSITIVE: Primary | ICD-10-CM

## 2025-01-05 NOTE — PROGRESS NOTES
[facility]  Jimy Matthews MD, FACS  Hayden Trevizo NP-C      Date of Service: 1/15/2025    Chief Complaint:   Nathaly Chaudhary is a 79 y.o. female presenting today for her 3 month follow up due to her breast cancer diagnosis.  She is due for her Mammogram and CXR.  She reports no interval changes on her self-breast examination.     History of Present Illness:   Nathaly Chaudhary was diagnosed with left breast cancer in April 2023 at the age of 77. She elected lumpectomy that showed a 1.9 cm Grade II IDC with negative margins and a negative SLN.  T1cN0. She completed adjuvant Radiation and started Tamoxifen in September 2023.  ER/TX Positive and HER 2 FISH negative.  MD:::Mery Perez NP; Beka Melton MD; Kendall Carranza MD; Kyle Yang MD     Past Medical History:   Diagnosis Date    Estrogen receptor positive 04/09/2024    Malignant neoplasm of lower-outer quadrant of left breast of female, estrogen receptor positive 04/10/2023    Mass of lower outer quadrant of left breast 03/31/2023    Seroma of breast 05/30/2023      Past Surgical History:   Procedure Laterality Date    BREAST LUMPECTOMY W/ NEEDLE LOCALIZATION Left 05/04/2023    with SLN Biopsy and OncoPlastic (reduction/lift)        Current Outpatient Medications:     albuterol (PROVENTIL/VENTOLIN HFA) 90 mcg/actuation inhaler, ProAir HFA Take No date recorded No form recorded No frequency recorded No route recorded No set duration recorded No set duration amount recorded active No dosage strength recorded No dosage strength units of measure recorded, Disp: , Rfl:     alendronate (FOSAMAX) 70 MG tablet, Take 70 mg by mouth every 7 days., Disp: , Rfl:     aspirin 81 MG Chew, Take 81 mg by mouth., Disp: , Rfl:     celecoxib (CELEBREX) 100 MG capsule, celecoxib Take No date recorded No form recorded No frequency recorded No route recorded No set duration recorded No set duration amount recorded active No dosage strength  recorded No dosage strength units of measure recorded, Disp: , Rfl:     cholecalciferol, vitamin D3, 10 mcg (400 unit) Cap capsule, Vitamin D3 Take No date recorded No form recorded No frequency recorded No route recorded No set duration recorded No set duration amount recorded active No dosage strength recorded No dosage strength units of measure recorded, Disp: , Rfl:     esomeprazole (NEXIUM) 40 MG capsule, esomeprazole magnesium Take 1 time per day No date recorded capsule,delayed release(DR/EC) 1 time per day No route recorded No set duration recorded No set duration amount recorded active 40 mg, Disp: , Rfl:     fluticasone-umeclidin-vilanter (TRELEGY ELLIPTA) 100-62.5-25 mcg DsDv, Inhale 1 puff into the lungs once daily., Disp: , Rfl:     losartan-hydrochlorothiazide 100-12.5 mg (HYZAAR) 100-12.5 mg Tab, Take by mouth., Disp: , Rfl:     metFORMIN (GLUCOPHAGE) 500 MG tablet, metformin Take 2 times per day No date recorded tablet 2 times per day No route recorded No set duration recorded No set duration amount recorded active 500 mg, Disp: , Rfl:     montelukast (SINGULAIR) 10 mg tablet, Take by mouth., Disp: , Rfl:     rosuvastatin (CRESTOR) 20 MG tablet, Take by mouth., Disp: , Rfl:     tamoxifen (NOLVADEX) 20 MG Tab, Take 1 tablet by mouth every morning., Disp: , Rfl:     tiotropium (SPIRIVA) 18 mcg inhalation capsule, Spiriva with HandiHaler Take No date recorded No form recorded No frequency recorded No route recorded No set duration recorded No set duration amount recorded suspended No dosage strength recorded No dosage strength units of measure recorded, Disp: , Rfl:     triamcinolone (KENALOG) 0.5 % ointment, Apply topically., Disp: , Rfl:     budesonide (PULMICORT) 0.25 mg/2 mL nebulizer solution, Inhale into the lungs. (Patient not taking: Reported on 1/15/2025), Disp: , Rfl:     cetirizine 0.24 % Dpet, cetirizine Take No date recorded No form recorded No frequency recorded No route recorded No set  duration recorded No set duration amount recorded active No dosage strength recorded No dosage strength units of measure recorded (Patient not taking: Reported on 1/15/2025), Disp: , Rfl:     diclofenac (VOLTAREN) 75 MG EC tablet, diclofenac sodium Take 2 times per day No date recorded tablet,delayed release (DR/EC) 2 times per day No route recorded No set duration recorded No set duration amount recorded active 75 mg, Disp: , Rfl:     fexofenadine (ALLEGRA) 180 MG tablet, fexofenadine Take No date recorded No form recorded No frequency recorded No route recorded No set duration recorded No set duration amount recorded suspended No dosage strength recorded No dosage strength units of measure recorded, Disp: , Rfl:     gabapentin (NEURONTIN) 100 MG capsule, Take by mouth. (Patient not taking: Reported on 1/15/2025), Disp: , Rfl:     levalbuterol (XOPENEX) 0.31 mg/3 mL nebulizer solution, Inhale into the lungs. (Patient not taking: Reported on 1/15/2025), Disp: , Rfl:     LINZESS 290 mcg Cap capsule, Take by mouth. (Patient not taking: Reported on 1/15/2025), Disp: , Rfl:     lisinopriL (PRINIVIL,ZESTRIL) 20 MG tablet, lisinopril Take 1 time per day No date recorded tablet 1 time per day No route recorded No set duration recorded No set duration amount recorded active 20 mg (Patient not taking: Reported on 1/15/2025), Disp: , Rfl:     metoprolol succinate (TOPROL-XL) 25 MG 24 hr tablet, metoprolol succinate Take 1 time per day No date recorded tablet extended release 24 hr 1 time per day No route recorded No set duration recorded No set duration amount recorded active 25 mg (Patient not taking: Reported on 1/15/2025), Disp: , Rfl:     predniSONE (DELTASONE) 10 MG tablet, Take by mouth. (Patient not taking: Reported on 1/15/2025), Disp: , Rfl:    Review of patient's allergies indicates:   Allergen Reactions    Adhesive tape-silicones     Tetanus vaccines and toxoid       Social History     Tobacco Use    Smoking  status: Never    Smokeless tobacco: Never   Substance Use Topics    Alcohol use: Never      Family History   Problem Relation Name Age of Onset    Breast cancer Neg Hx      Ovarian cancer Neg Hx          Review of Systems   Integumentary:  Negative for color change, rash, mole/lesion, breast mass, breast discharge and breast tenderness.   Breast: Negative for mass and tenderness       Physical Exam   Constitutional: She is cooperative.   HENT:   Head: Normocephalic.   Pulmonary/Chest: She exhibits no mass. Right breast exhibits no inverted nipple, no mass, no nipple discharge, no skin change and no tenderness. Left breast exhibits no inverted nipple, no mass, no nipple discharge, no skin change and no tenderness.   Abdominal: Normal appearance.   Musculoskeletal: Lymphadenopathy:      Upper Body:      Right upper body: No supraclavicular or axillary adenopathy.      Left upper body: No supraclavicular or axillary adenopathy.     Neurological: She is alert.   Skin: No rash noted.          MAMMOGRAM REPORT: FINDINGS:  There are scattered areas of fibroglandular density. No suspicious   mass, calcification, or architectural distortion of either breast to suggest malignancy. Benign evolving fat necrosis with microcalcifications is noted bilaterally. Additional benign calcifications are noted as well. Surgical changes of bilateral reduction mammoplasty in left-sided lumpectomy again noted. IMPRESSION:  No mammographic evidence of malignancy. No significant change when compared to prior exam. Annual mammogram in 1 year is recommended.    CXR REPORT: Cervical spinal hardware and a left shoulder prosthesis are again identified.   Surgical clips are present in the left breast. Mild cardiomegaly. Minimal basilar atelectasis/scarring. Mild bronchiectasis and bronchial wall thickening appears to be present in the retrocardiac region   similar to the prior exam. A stable small vague nodular opacity projecting over the right  lower lung zone is also again noted. No new concerning pulmonary nodules. No acute cardiopulmonary abnormality.    ASSESSMENT and PLAN OF CARE     1. Malignant neoplasm of lower-outer quadrant of left breast of female, estrogen receptor positive  Assessment & Plan:  Patient is doing well and is being followed according to NCCN Guidelines. She understands that her imaging and exams have remained stable and is comfortable being followed in a conservative fashion. She understands the importance of monthly self-breast examination and knows to report any and all changes as they occur.    NOTE:::We viewed her films together at today's visit.  We discussed the multiple views obtained and the important findings.  Even benign changes were mentioned and her questions were answered.  She knows that she may receive a formal letter or report from the Radiologist.  She is to contact us if she has questions.    She should continue her Tamoxifen as directed.  We discussed some of the side effects and methods to combat these.  Should she have any issues, she can contact us as needed.    Labs obtained today: CBC, Chem 12, CEA, LDH, CA27/29 - after resulted, these will be compared to her previous values and all abnormal values will be phoned to her for discussion.      Orders:  -     Cancer Antigen 27-29; Future; Expected date: 01/15/2025  -     Comprehensive Metabolic Panel; Future; Expected date: 01/15/2025  -     Lactate Dehydrogenase; Future; Expected date: 01/15/2025  -     CEA; Future; Expected date: 01/15/2025  -     CBC Auto Differential; Future; Expected date: 01/15/2025    2. Estrogen receptor positive  Assessment & Plan:  Continue Tamoxifen.       Medical Decision Making: It is my impression that this patient suffers all conditions contained in this medical document.  Each of these conditions did affect our plan of care and my medical decision making today.  It is my opinion that the medical decision making concerning this  patient was of moderate difficulty based on the aforementioned conditions.  Any further recommendations will be communicated to the patient by me.  I have reviewed and verified her allergies, list of medications, medical and surgical histories, social history, and a pertinent review of symptoms.     Follow up:  3 months and prn    For:  Physical Examination and LABS

## 2025-01-15 ENCOUNTER — LAB VISIT (OUTPATIENT)
Dept: LAB | Facility: HOSPITAL | Age: 80
End: 2025-01-15
Attending: NURSE PRACTITIONER
Payer: MEDICARE

## 2025-01-15 ENCOUNTER — OFFICE VISIT (OUTPATIENT)
Dept: SURGERY | Facility: CLINIC | Age: 80
End: 2025-01-15
Payer: MEDICARE

## 2025-01-15 DIAGNOSIS — Z17.0 ESTROGEN RECEPTOR POSITIVE: ICD-10-CM

## 2025-01-15 DIAGNOSIS — C50.512 MALIGNANT NEOPLASM OF LOWER-OUTER QUADRANT OF LEFT BREAST OF FEMALE, ESTROGEN RECEPTOR POSITIVE: Primary | ICD-10-CM

## 2025-01-15 DIAGNOSIS — Z17.0 MALIGNANT NEOPLASM OF LOWER-OUTER QUADRANT OF LEFT BREAST OF FEMALE, ESTROGEN RECEPTOR POSITIVE: ICD-10-CM

## 2025-01-15 DIAGNOSIS — C50.512 MALIGNANT NEOPLASM OF LOWER-OUTER QUADRANT OF LEFT BREAST OF FEMALE, ESTROGEN RECEPTOR POSITIVE: ICD-10-CM

## 2025-01-15 DIAGNOSIS — Z17.0 MALIGNANT NEOPLASM OF LOWER-OUTER QUADRANT OF LEFT BREAST OF FEMALE, ESTROGEN RECEPTOR POSITIVE: Primary | ICD-10-CM

## 2025-01-15 LAB
ALBUMIN SERPL BCP-MCNC: 3.8 G/DL (ref 3.5–5.2)
ALP SERPL-CCNC: 46 U/L (ref 40–150)
ALT SERPL W/O P-5'-P-CCNC: 12 U/L (ref 10–44)
ANION GAP SERPL CALC-SCNC: 8 MMOL/L (ref 8–16)
AST SERPL-CCNC: 19 U/L (ref 10–40)
BASOPHILS # BLD AUTO: 0.02 K/UL (ref 0–0.2)
BASOPHILS NFR BLD: 0.6 % (ref 0–1.9)
BILIRUB SERPL-MCNC: 0.4 MG/DL (ref 0.1–1)
BUN SERPL-MCNC: 9 MG/DL (ref 8–23)
CALCIUM SERPL-MCNC: 9.2 MG/DL (ref 8.7–10.5)
CEA SERPL-MCNC: 2.3 NG/ML (ref 0–5)
CHLORIDE SERPL-SCNC: 112 MMOL/L (ref 95–110)
CO2 SERPL-SCNC: 24 MMOL/L (ref 23–29)
CREAT SERPL-MCNC: 0.7 MG/DL (ref 0.5–1.4)
DIFFERENTIAL METHOD BLD: ABNORMAL
EOSINOPHIL # BLD AUTO: 0 K/UL (ref 0–0.5)
EOSINOPHIL NFR BLD: 0.9 % (ref 0–8)
ERYTHROCYTE [DISTWIDTH] IN BLOOD BY AUTOMATED COUNT: 15.7 % (ref 11.5–14.5)
EST. GFR  (NO RACE VARIABLE): >60 ML/MIN/1.73 M^2
GLUCOSE SERPL-MCNC: 85 MG/DL (ref 70–110)
HCT VFR BLD AUTO: 32.4 % (ref 37–48.5)
HGB BLD-MCNC: 10 G/DL (ref 12–16)
IMM GRANULOCYTES # BLD AUTO: 0.01 K/UL (ref 0–0.04)
IMM GRANULOCYTES NFR BLD AUTO: 0.3 % (ref 0–0.5)
LDH SERPL L TO P-CCNC: 229 U/L (ref 110–260)
LYMPHOCYTES # BLD AUTO: 1.1 K/UL (ref 1–4.8)
LYMPHOCYTES NFR BLD: 33.1 % (ref 18–48)
MCH RBC QN AUTO: 28.5 PG (ref 27–31)
MCHC RBC AUTO-ENTMCNC: 30.9 G/DL (ref 32–36)
MCV RBC AUTO: 92 FL (ref 82–98)
MONOCYTES # BLD AUTO: 0.5 K/UL (ref 0.3–1)
MONOCYTES NFR BLD: 14.4 % (ref 4–15)
NEUTROPHILS # BLD AUTO: 1.7 K/UL (ref 1.8–7.7)
NEUTROPHILS NFR BLD: 50.7 % (ref 38–73)
NRBC BLD-RTO: 0 /100 WBC
PLATELET # BLD AUTO: 230 K/UL (ref 150–450)
PMV BLD AUTO: 10.5 FL (ref 9.2–12.9)
POTASSIUM SERPL-SCNC: 4.3 MMOL/L (ref 3.5–5.1)
PROT SERPL-MCNC: 7 G/DL (ref 6–8.4)
RBC # BLD AUTO: 3.51 M/UL (ref 4–5.4)
SODIUM SERPL-SCNC: 144 MMOL/L (ref 136–145)
WBC # BLD AUTO: 3.41 K/UL (ref 3.9–12.7)

## 2025-01-15 PROCEDURE — 36415 COLL VENOUS BLD VENIPUNCTURE: CPT | Performed by: NURSE PRACTITIONER

## 2025-01-15 PROCEDURE — 1160F RVW MEDS BY RX/DR IN RCRD: CPT | Mod: CPTII,S$GLB,, | Performed by: NURSE PRACTITIONER

## 2025-01-15 PROCEDURE — 80053 COMPREHEN METABOLIC PANEL: CPT | Performed by: NURSE PRACTITIONER

## 2025-01-15 PROCEDURE — 85025 COMPLETE CBC W/AUTO DIFF WBC: CPT | Performed by: NURSE PRACTITIONER

## 2025-01-15 PROCEDURE — 82378 CARCINOEMBRYONIC ANTIGEN: CPT | Performed by: NURSE PRACTITIONER

## 2025-01-15 PROCEDURE — 99214 OFFICE O/P EST MOD 30 MIN: CPT | Mod: S$GLB,,, | Performed by: NURSE PRACTITIONER

## 2025-01-15 PROCEDURE — 99999 PR PBB SHADOW E&M-EST. PATIENT-LVL III: CPT | Mod: PBBFAC,,, | Performed by: NURSE PRACTITIONER

## 2025-01-15 PROCEDURE — 1159F MED LIST DOCD IN RCRD: CPT | Mod: CPTII,S$GLB,, | Performed by: NURSE PRACTITIONER

## 2025-01-15 PROCEDURE — 86300 IMMUNOASSAY TUMOR CA 15-3: CPT | Performed by: NURSE PRACTITIONER

## 2025-01-15 PROCEDURE — 83615 LACTATE (LD) (LDH) ENZYME: CPT | Performed by: NURSE PRACTITIONER

## 2025-01-17 LAB — CANCER AG27-29 SERPL-ACNC: 14.3 U/ML

## 2025-04-15 ENCOUNTER — PATIENT MESSAGE (OUTPATIENT)
Dept: NEUROLOGY | Facility: CLINIC | Age: 80
End: 2025-04-15
Payer: MEDICARE